# Patient Record
Sex: FEMALE | Race: WHITE | NOT HISPANIC OR LATINO | URBAN - METROPOLITAN AREA
[De-identification: names, ages, dates, MRNs, and addresses within clinical notes are randomized per-mention and may not be internally consistent; named-entity substitution may affect disease eponyms.]

---

## 2020-02-12 ENCOUNTER — OFFICE VISIT (OUTPATIENT)
Dept: FAMILY MEDICINE | Facility: CLINIC | Age: 30
End: 2020-02-12
Payer: COMMERCIAL

## 2020-02-12 VITALS
HEIGHT: 64 IN | DIASTOLIC BLOOD PRESSURE: 75 MMHG | OXYGEN SATURATION: 99 % | TEMPERATURE: 97.7 F | HEART RATE: 75 BPM | SYSTOLIC BLOOD PRESSURE: 117 MMHG | BODY MASS INDEX: 24.01 KG/M2 | WEIGHT: 140.6 LBS

## 2020-02-12 DIAGNOSIS — R53.82 CHRONIC FATIGUE: ICD-10-CM

## 2020-02-12 DIAGNOSIS — E06.3 HASHIMOTO'S DISEASE: Primary | ICD-10-CM

## 2020-02-12 DIAGNOSIS — M53.3 SACRAL PAIN: ICD-10-CM

## 2020-02-12 PROBLEM — D72.9: Status: RESOLVED | Noted: 2020-02-12 | Resolved: 2020-02-12

## 2020-02-12 PROBLEM — D50.9 IRON DEFICIENCY ANEMIA: Status: ACTIVE | Noted: 2020-02-12

## 2020-02-12 PROBLEM — D72.829 ELEVATED WHITE BLOOD CELL COUNT: Status: ACTIVE | Noted: 2020-02-12

## 2020-02-12 PROBLEM — G43.109 MIGRAINE WITH AURA: Status: ACTIVE | Noted: 2020-02-12

## 2020-02-12 PROBLEM — F42.9 OCD (OBSESSIVE COMPULSIVE DISORDER): Status: ACTIVE | Noted: 2020-02-12

## 2020-02-12 PROBLEM — B00.1 HERPES LABIALIS: Status: ACTIVE | Noted: 2020-02-12

## 2020-02-12 PROBLEM — I34.1 MITRAL VALVE PROLAPSE: Status: ACTIVE | Noted: 2020-02-12

## 2020-02-12 PROBLEM — K58.9 IRRITABLE BOWEL SYNDROME: Status: ACTIVE | Noted: 2020-02-12

## 2020-02-12 PROBLEM — D72.9: Status: ACTIVE | Noted: 2020-02-12

## 2020-02-12 PROCEDURE — 99203 OFFICE O/P NEW LOW 30 MIN: CPT | Performed by: FAMILY MEDICINE

## 2020-02-12 RX ORDER — VALACYCLOVIR HYDROCHLORIDE 500 MG/1
TABLET, FILM COATED ORAL
COMMUNITY
Start: 2020-02-06 | End: 2020-02-12 | Stop reason: SDUPTHER

## 2020-02-12 RX ORDER — VALACYCLOVIR HYDROCHLORIDE 500 MG/1
500 TABLET, FILM COATED ORAL DAILY
Qty: 90 TABLET | Refills: 1 | Status: ON HOLD | OUTPATIENT
Start: 2020-02-12 | End: 2021-03-18

## 2020-02-12 RX ORDER — SERTRALINE HYDROCHLORIDE 50 MG/1
TABLET, FILM COATED ORAL
COMMUNITY
Start: 2019-12-11 | End: 2020-02-12 | Stop reason: SDUPTHER

## 2020-02-12 RX ORDER — LEVOTHYROXINE SODIUM 88 UG/1
TABLET ORAL
COMMUNITY
Start: 2019-12-24 | End: 2020-02-12 | Stop reason: SDUPTHER

## 2020-02-12 RX ORDER — LEVOTHYROXINE SODIUM 88 UG/1
88 TABLET ORAL
Qty: 90 TABLET | Refills: 1 | Status: SHIPPED | OUTPATIENT
Start: 2020-02-12 | End: 2020-11-15 | Stop reason: SDUPTHER

## 2020-02-12 RX ORDER — SERTRALINE HYDROCHLORIDE 50 MG/1
50 TABLET, FILM COATED ORAL DAILY
Qty: 90 TABLET | Refills: 0 | Status: ON HOLD | OUTPATIENT
Start: 2020-02-12 | End: 2021-03-18

## 2020-02-12 SDOH — HEALTH STABILITY: MENTAL HEALTH: HOW OFTEN DO YOU HAVE A DRINK CONTAINING ALCOHOL?: NEVER

## 2020-02-12 ASSESSMENT — ENCOUNTER SYMPTOMS
FATIGUE: 1
BACK PAIN: 1

## 2020-02-12 NOTE — PATIENT INSTRUCTIONS
Lets recheck your labs in March.   Try the stretches for at least 4 weeks.   Let me know if they are not improving. You can also get the xray at that time.

## 2020-02-12 NOTE — PROGRESS NOTES
[place] [address]  DAPHNIE Deutsch [facility]ly [facility]  [phone]       Reason for visit:   Chief Complaint   Patient presents with   • thyroid issues      HPI   Sherita Chavez is a 29 y.o. female who presents for follow-up of thyroid.      1. Hypothyroid  Diagnosed at 9yo.   Followed with derick akers.   Takes synthroid 88mcg daily.   Has chronic fatigue.   Last labs in December.     2. Sacral Pain  For ~ 6 weeks.   No known injury.   Bothers her every day.   Sometimes worse with sittin on a certain surface but no particular pattern.   Sometimes worse with bending.   1/10 in intensity.   Hurts to touch.     3. Chronic fatigue-  Present for 10 years.   Started with mono dx in college, feels like it never recovered.   Sleeps 8-10 hours every night.   Started zoloft in 2018 for OCD, no difference in energy.          Past Medical History:   Diagnosis Date   • Hashimoto's disease    • High white blood cell cystine    • IBS (irritable bowel syndrome)    • Migraines    • Mitral valve prolapse    • OCD (obsessive compulsive disorder)      Past Surgical History:   Procedure Laterality Date   • WISDOM TOOTH EXTRACTION       Social History     Socioeconomic History   • Marital status:      Spouse name: Not on file   • Number of children: Not on file   • Years of education: Not on file   • Highest education level: Not on file   Occupational History   • Not on file   Social Needs   • Financial resource strain: Not on file   • Food insecurity:     Worry: Not on file     Inability: Not on file   • Transportation needs:     Medical: Not on file     Non-medical: Not on file   Tobacco Use   • Smoking status: Never Smoker   • Smokeless tobacco: Never Used   Substance and Sexual Activity   • Alcohol use: Never     Frequency: Never   • Drug use: Not on file   • Sexual activity: Not on file   Lifestyle   • Physical activity:     Days per week: Not on file     Minutes per session: Not  "on file   • Stress: Not on file   Relationships   • Social connections:     Talks on phone: Not on file     Gets together: Not on file     Attends Zoroastrian service: Not on file     Active member of club or organization: Not on file     Attends meetings of clubs or organizations: Not on file     Relationship status: Not on file   • Intimate partner violence:     Fear of current or ex partner: Not on file     Emotionally abused: Not on file     Physically abused: Not on file     Forced sexual activity: Not on file   Other Topics Concern   • Not on file   Social History Narrative    Do you wear your seatbelt? Yes    Do you have smoke detector in your home? Yes    Do you have a carbon monoxide detector in your home? 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      Family History   Problem Relation Age of Onset   • Colon cancer Paternal Grandfather    • Breast cancer Neg Hx      Amoxicillin; Avelox [moxifloxacin]; Bactrim [sulfamethoxazole-trimethoprim]; and Ciprofloxacin  Current Outpatient Medications   Medication Sig Dispense Refill   • sertraline (ZOLOFT) 50 mg tablet Take 1 tablet (50 mg total) by mouth daily. 90 tablet 0   • SYNTHROID 88 mcg tablet Take 1 tablet (88 mcg total) by mouth daily. Brand necessary 90 tablet 1   • valACYclovir (VALTREX) 500 mg tablet Take 1 tablet (500 mg total) by mouth daily. 90 tablet 1     No current facility-administered medications for this visit.        Review of Systems   Constitutional: Positive for fatigue.   Musculoskeletal: Positive for back pain.     Objective   Vitals:    02/12/20 1735   BP: 117/75   BP Location: Left upper arm   Patient Position: Sitting   Pulse: 75   Temp: 36.5 °C (97.7 °F)   TempSrc: Oral   SpO2: 99%   Weight: 63.8 kg (140 lb 9.6 oz)   Height: 1.626 m (5' 4\")       Physical Exam   Constitutional: She appears well-developed and well-nourished.   Cardiovascular: Normal rate, regular rhythm, S1 normal and S2 normal.   Pulmonary/Chest: " Effort normal and breath sounds normal. No respiratory distress. She has no wheezes. She has no rhonchi. She has no rales.   Musculoskeletal: She exhibits no edema.   + TTP over sacrum; No parapspinal TTP;  5/5 strength lower extremities.  Normal symmetric patellar reflexes.  No visible scoliosis.  Mild pain with flexion and extension.     Psychiatric: She has a normal mood and affect. Her speech is normal and behavior is normal. Judgment and thought content normal. Cognition and memory are normal.       Procedures    No results found for: WBC, HGB, HCT, PLT, CHOL, TRIG, HDL, LDLDIRECT, ALT, AST, NA, K, CL, CREATININE, BUN, CO2, TSH, PSA, INR, HGBA1C, MICROALBUR      Assessment   Problem List Items Addressed This Visit        Endocrine/Metabolic    Hashimoto's disease - Primary     Refill meds.   Check labs in March.          Relevant Medications    SYNTHROID 88 mcg tablet    Other Relevant Orders    TSH w reflex FT4       Other    Chronic fatigue     Check labs as ordered in March.          Relevant Orders    CBC and Differential    Comprehensive metabolic panel    TSH w reflex FT4    ARIADNA Screen (Automated)      Other Visit Diagnoses     Sacral pain        Trial of stretches.   Xray and f/u in 4 weeks if no improvement.     Relevant Orders    X-RAY SACRUM AND COCCYX              Tanisha Plummer MD  2/12/2020

## 2020-02-26 ENCOUNTER — OFFICE VISIT (OUTPATIENT)
Dept: CARDIOLOGY | Facility: CLINIC | Age: 30
End: 2020-02-26
Payer: COMMERCIAL

## 2020-02-26 ENCOUNTER — TELEPHONE (OUTPATIENT)
Dept: CARDIOLOGY | Facility: CLINIC | Age: 30
End: 2020-02-26

## 2020-02-26 VITALS
BODY MASS INDEX: 23.9 KG/M2 | DIASTOLIC BLOOD PRESSURE: 68 MMHG | HEART RATE: 81 BPM | WEIGHT: 140 LBS | SYSTOLIC BLOOD PRESSURE: 110 MMHG | RESPIRATION RATE: 14 BRPM | HEIGHT: 64 IN

## 2020-02-26 DIAGNOSIS — R00.2 PALPITATIONS: ICD-10-CM

## 2020-02-26 DIAGNOSIS — I49.3 PVC (PREMATURE VENTRICULAR CONTRACTION): ICD-10-CM

## 2020-02-26 DIAGNOSIS — I34.1 MITRAL VALVE PROLAPSE: ICD-10-CM

## 2020-02-26 DIAGNOSIS — I34.1 MVP (MITRAL VALVE PROLAPSE): Primary | ICD-10-CM

## 2020-02-26 PROCEDURE — 99244 OFF/OP CNSLTJ NEW/EST MOD 40: CPT | Performed by: INTERNAL MEDICINE

## 2020-02-26 PROCEDURE — 93000 ELECTROCARDIOGRAM COMPLETE: CPT | Performed by: INTERNAL MEDICINE

## 2020-02-26 RX ORDER — VALACYCLOVIR HYDROCHLORIDE 1 G/1
2 TABLET, FILM COATED ORAL AS NEEDED
Status: ON HOLD | COMMUNITY
End: 2021-03-16 | Stop reason: SDUPTHER

## 2020-02-26 NOTE — LETTER
"2020     Tanisha Plummer MD  [address] [address]    Patient: Sherita Chavez  YOB: 1990  Date of Visit: 2020      Dear Dr. Plummer:    Thank you for referring Sherita Chavez to me for evaluation. Below are my notes for this consultation.    If you have questions, please do not hesitate to call me. I look forward to following your patient along with you.         Sincerely,        Prabhu Cook DO        CC: No Recipients  Prabhu Cook DO  2020  3:34 PM  Sign at close encounter       Prabhu Cook D.O., [facility]    Clinical Cardiology and Heart Failure     [facility]  The Heart [facility] Level  [address] [place] [address]     TEL  [phone]  [facility]       2020    Re:  Sherita Chavez  : 1990    Dear Dr. Tanisha Plummer,    Thank you, for asking me to see Sherita for transfer of her cardiology care as her longtime cardiologist, Dr. Dev Neely, retired.  She was diagnosed with mitral valve prolapse years ago and was told at one point she may have grown out of it but now it is just \"mild\".  He would do echocardiograms on alternating years along with physical exam and EKG.    She is a pleasant 29-year-old female who was diagnosed with Hashimoto's thyroiditis and mitral valve prolapse at age 10.  In  she had the abrupt onset of severe palpitations at night and could really catch her breath on went to the emergency room and had SVT and received adenosine.  She has had no further recurrence of the SVT since then.  She does have chronic PVCs however and feels occasional flutters including a few in the waiting room today.  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  She frequently takes the elevator.  Her apartment is on the fourth floor and by walking up 4 flights of steps might " "be out of breath.  She has felt intermittent swelling of the hands and feet of late.  Her thyroid testing has been normal.  She denies orthopnea or PND.  No chest pain or syncope.    PAST MEDICAL HISTORY:  All aspects of this documentation have been updated and/or entered into our EHR.  [place] tooth extraction, SVT, MVP, OCD, Hashimoto's thyroiditis    MEDICATIONS:   · Sertraline  · Synthroid  · Valacyclovir    ALLERGIES: Amoxicillin, Avelox, Bactrim, Cipro, all caused hives and rash    SOCIAL HISTORY: She is  to her  Dylon.  No children.  She is an RN at [employer] on the [employer] floor.  She has never smoked.    FAMILY HISTORY: Mother is alive with hyperlipidemia.  Dad is alive and has atrial fibrillation.  Paternal grandfather with atrial fibrillation.  She has 2 brothers.  One might of had a heart murmur.    REVIEW OF SYSTEMS: Aside from what is mentioned above, a 14 point review of systems was performed and was negative.    PHYSICAL EXAMINATION:  Visit Vitals  /68   Pulse 81   Resp 14   Ht 1.626 m (5' 4\")   Wt 63.5 kg (140 lb)   BMI 24.03 kg/m²     Body surface area is 1.69 meters squared.  General: Pleasant and in no acute distress.  HEENT: No corneal arcus or xanthelasmas.  Sclerae are anicteric.  Nares patent.  Mucous membranes moist.  Neck: Supple.  JVP is 5 cm/H2O.  Carotids are equal with no audible bruits.  No lymphadenopathy or thyromegaly.  Heart: Regular.  Normal S1 and S2.  No S4. No S3.  I cannot appreciate any murmurs despite handgrip, forced expiration, lateral recumbent, supine etc.  I cannot hear a mitral click.  Chest: Symmetrical.  Lungs: Clear bilaterally without rales, wheezes nor rhonchi.  Abdomen: Soft, nontender.  No masses or bruits.  No organomegaly.  Normal bowel sounds.  Extremities: No cyanosis, clubbing or edema.  Distal pulses are easily palpable.  Skin: Warm and dry and well perfused.  Neurologic: Alert and oriented ×3.  Cranial nerves II through XII are " "intact.  Psychiatric: Normal mood, affect & judgment.    DIAGNOSTIC DATA:    EKG: Normal sinus rhythm, AZ interval is normal, not short as the computer interpreted.  This is a normal tracing.    Labs:       No results found for: NA, K, MG, BUN, CREATININE, EGFR, BNP, WBC, HGB, PLT, ALT, AST, GLUCOSE, HGBA1C, TSH, INR, TROPONINI    Lipid Profile:   No results found for: CHOL  No results found for: TRIG  No results found for: HDL  No results found for: LDLCALC    Echocardiogram 2017:  · Performed at Dr. Dev Neely's office; report unavailable  · Reportedly \"mild MVP\"    Holter monitor 2017:  · Performed at Dr. Dev Neely's office; report unavailable  · Reportedly isolated PVCs    IMPRESSION/RECOMMENDATIONS:  1. History of mitral valve prolapse -Sherita was diagnosed with mitral valve prolapse at age 10.  Her previous cardiologist was very concerned and told her to avoid salt and that she would likely need valve replacement in the future.  She got a second opinion with Dr. Dev Neely and followed with him for many years until his recent USP.  He would do echocardiograms periodically and really downplayed the degree of MVP which I tend to agree with, given her rather benign physical examination.  She was told she had \"floppy\" valve but it really does not sound like the classic Faith's syndrome valve on examination.  Nonetheless I will check a follow-up echocardiogram to assess the degree of structure and function at the age of 29 as her heart has matured.  2. PVCs -she is ultrasensitive to the PVCs and felt some in the waiting room and then some again in the exam here but twelve-lead EKG for the 10-second 20 analyze did not have a single ectopic beat.  PVCs are common and benign especially in the setting of preserved ejection fraction.  MVP patients might be more prone to having PVCs.  3. History of SVT -this occurred in 2007 and was severe palpitations and she required adenosine in the emergency " department.  She has had no recurrence.  4. Hashimoto's thyroiditis with resultant hypothyroidism -she is on Synthroid therapy and thyroid function tests have been followed through your office.  5. Weight gain -she has had 20 pound weight gain in the past 6 months.  I do not detect this to be anything in regard to fluid retention.    Counseling and coordination of care exceeded >50% of this 60-minute patient encounter.    Dr. Plummer, thank you for allowing me to share in the care of your patient.  I asked Sherita to return in 1 year.  If you have any further questions, please do not hesitate to contact me.    Sincerely,    Prabhu Cook D.O., [facility]

## 2020-02-26 NOTE — PATIENT INSTRUCTIONS
1. You carry a history of MVP since age 10  2. Your examination of the heart is rather benign so perhaps very very very very very mild MVP  3. PVCs have been chronic and the palpitations from these should not wear you  4. Please schedule echocardiogram soon  5. We will call you with the results  6. Return in 1 year and then we plan on follow-up thereafter

## 2020-02-26 NOTE — PROGRESS NOTES
"     Prabhu Cook D.O., [facility]    Clinical Cardiology and Heart Failure     [facility]     [facility]  The Heart [address] Level  [address] [place] [address]     TEL  [phone]  [facility]       2020    Re:  Sherita Chavez  : 1990    Dear Dr. Tanisha Plummer,    Thank you, for asking me to see Sherita for transfer of her cardiology care as her longtime cardiologist, Dr. Dev Neely, retired.  She was diagnosed with mitral valve prolapse years ago and was told at one point she may have grown out of it but now it is just \"mild\".  He would do echocardiograms on alternating years along with physical exam and EKG.    She is a pleasant 29-year-old female who was diagnosed with Hashimoto's thyroiditis and mitral valve prolapse at age 10.  In  she had the abrupt onset of severe palpitations at night and could really catch her breath on went to the emergency room and had SVT and received adenosine.  She has had no further recurrence of the SVT since then.  She does have chronic PVCs however and feels occasional flutters including a few in the waiting room today.  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  She frequently takes the elevator.  Her apartment is on the fourth floor and by walking up 4 flights of steps might be out of breath.  She has felt intermittent swelling of the hands and feet of late.  Her thyroid testing has been normal.  She denies orthopnea or PND.  No chest pain or syncope.    PAST MEDICAL HISTORY:  All aspects of this documentation have been updated and/or entered into our EHR.  [place] tooth extraction, SVT, MVP, OCD, Hashimoto's thyroiditis    MEDICATIONS:   · Sertraline  · Synthroid  · Valacyclovir    ALLERGIES: Amoxicillin, Avelox, Bactrim, Cipro, all caused hives and rash    SOCIAL HISTORY: She is  to her  Dylon.  No children.  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  She has never smoked.    FAMILY HISTORY: Mother is alive with hyperlipidemia.  Dad is alive and has atrial fibrillation.  Paternal grandfather with atrial fibrillation.  She has 2 brothers.  One might of had a heart murmur.    REVIEW OF SYSTEMS: Aside from what is mentioned above, a 14 point review of systems was performed and was negative.    PHYSICAL EXAMINATION:  Visit Vitals  /68   Pulse 81   Resp 14   Ht 1.626 m (5' 4\")   Wt 63.5 kg (140 lb)   BMI 24.03 kg/m²     Body surface area is 1.69 meters squared.  General: Pleasant and in no acute distress.  HEENT: No corneal arcus or xanthelasmas.  Sclerae are anicteric.  Nares patent.  Mucous membranes moist.  Neck: Supple.  JVP is 5 cm/H2O.  Carotids are equal with no audible bruits.  No lymphadenopathy or thyromegaly.  Heart: Regular.  Normal S1 and S2.  No S4. No S3.  I cannot appreciate any murmurs despite handgrip, forced expiration, lateral recumbent, supine etc.  I cannot hear a mitral click.  Chest: Symmetrical.  Lungs: Clear bilaterally without rales, wheezes nor rhonchi.  Abdomen: Soft, nontender.  No masses or bruits.  No organomegaly.  Normal bowel sounds.  Extremities: No cyanosis, clubbing or edema.  Distal pulses are easily palpable.  Skin: Warm and dry and well perfused.  Neurologic: Alert and oriented ×3.  Cranial nerves II through XII are intact.  Psychiatric: Normal mood, affect & judgment.    DIAGNOSTIC DATA:    EKG: Normal sinus rhythm, NE interval is normal, not short as the computer interpreted.  This is a normal tracing.    Labs:       No results found for: NA, K, MG, BUN, CREATININE, EGFR, BNP, WBC, HGB, PLT, ALT, AST, GLUCOSE, HGBA1C, TSH, INR, TROPONINI    Lipid Profile:   No results found for: CHOL  No results found for: TRIG  No results found for: HDL  No results found for: LDLCALC    Echocardiogram 2017:  · Performed at Dr. Dev Neely's office; report unavailable  · Reportedly \"mild " "MVP\"    Holter monitor 2017:  · Performed at Dr. Dev Neely's office; report unavailable  · Reportedly isolated PVCs    IMPRESSION/RECOMMENDATIONS:  1. History of mitral valve prolapse -Sherita was diagnosed with mitral valve prolapse at age 10.  Her previous cardiologist was very concerned and told her to avoid salt and that she would likely need valve replacement in the future.  She got a second opinion with Dr. Dev Neely and followed with him for many years until his recent jail.  He would do echocardiograms periodically and really downplayed the degree of MVP which I tend to agree with, given her rather benign physical examination.  She was told she had \"floppy\" valve but it really does not sound like the classic Faith's syndrome valve on examination.  Nonetheless I will check a follow-up echocardiogram to assess the degree of structure and function at the age of 29 as her heart has matured.  2. PVCs -she is ultrasensitive to the PVCs and felt some in the waiting room and then some again in the exam here but twelve-lead EKG for the 10-second 20 analyze did not have a single ectopic beat.  PVCs are common and benign especially in the setting of preserved ejection fraction.  MVP patients might be more prone to having PVCs.  3. History of SVT -this occurred in 2007 and was severe palpitations and she required adenosine in the emergency department.  She has had no recurrence.  4. Hashimoto's thyroiditis with resultant hypothyroidism -she is on Synthroid therapy and thyroid function tests have been followed through your office.  5. Weight gain -she has had 20 pound weight gain in the past 6 months.  I do not detect this to be anything in regard to fluid retention.    Counseling and coordination of care exceeded >50% of this 60-minute patient encounter.    Dr. Plummer, thank you for allowing me to share in the care of your patient.  I asked Sherita to return in 1 year.  If you have any further questions, " please do not hesitate to contact me.    Sincerely,    Prabhu Cook D.O., [facility]

## 2020-06-09 LAB
ALBUMIN SERPL-MCNC: 4.6 G/DL (ref 3.8–4.8)
ALBUMIN/GLOB SERPL: 1.9 {RATIO} (ref 1.2–2.2)
ALP SERPL-CCNC: 65 IU/L (ref 39–117)
ALT SERPL-CCNC: 8 IU/L (ref 0–32)
ANA TITR SER IF: NEGATIVE {TITER}
AST SERPL-CCNC: 12 IU/L (ref 0–40)
BASOPHILS # BLD AUTO: 0 X10E3/UL (ref 0–0.2)
BASOPHILS NFR BLD AUTO: 1 %
BILIRUB SERPL-MCNC: 0.4 MG/DL (ref 0–1.2)
BUN SERPL-MCNC: 6 MG/DL (ref 6–20)
BUN/CREAT SERPL: 8 (ref 9–23)
CALCIUM SERPL-MCNC: 9.4 MG/DL (ref 8.7–10.2)
CHLORIDE SERPL-SCNC: 101 MMOL/L (ref 96–106)
CO2 SERPL-SCNC: 23 MMOL/L (ref 20–29)
CREAT SERPL-MCNC: 0.71 MG/DL (ref 0.57–1)
EOSINOPHIL # BLD AUTO: 0.2 X10E3/UL (ref 0–0.4)
EOSINOPHIL NFR BLD AUTO: 3 %
ERYTHROCYTE [DISTWIDTH] IN BLOOD BY AUTOMATED COUNT: 12.2 % (ref 11.7–15.4)
GLOBULIN SER CALC-MCNC: 2.4 G/DL (ref 1.5–4.5)
GLUCOSE SERPL-MCNC: 75 MG/DL (ref 65–99)
HCT VFR BLD AUTO: 43.5 % (ref 34–46.6)
HGB BLD-MCNC: 14.1 G/DL (ref 11.1–15.9)
IMM GRANULOCYTES # BLD AUTO: 0 X10E3/UL (ref 0–0.1)
IMM GRANULOCYTES NFR BLD AUTO: 0 %
LAB CORP EGFR IF AFRICN AM: 131 ML/MIN/1.73
LAB CORP EGFR IF NONAFRICN AM: 114 ML/MIN/1.73
LAB CORP PLEASE NOTE:: NORMAL
LYMPHOCYTES # BLD AUTO: 1.4 X10E3/UL (ref 0.7–3.1)
LYMPHOCYTES NFR BLD AUTO: 29 %
MCH RBC QN AUTO: 30.9 PG (ref 26.6–33)
MCHC RBC AUTO-ENTMCNC: 32.4 G/DL (ref 31.5–35.7)
MCV RBC AUTO: 95 FL (ref 79–97)
MONOCYTES # BLD AUTO: 0.5 X10E3/UL (ref 0.1–0.9)
MONOCYTES NFR BLD AUTO: 10 %
NEUTROPHILS # BLD AUTO: 2.8 X10E3/UL (ref 1.4–7)
NEUTROPHILS NFR BLD AUTO: 57 %
PLATELET # BLD AUTO: 200 X10E3/UL (ref 150–450)
POTASSIUM SERPL-SCNC: 3.9 MMOL/L (ref 3.5–5.2)
PROT SERPL-MCNC: 7 G/DL (ref 6–8.5)
RBC # BLD AUTO: 4.57 X10E6/UL (ref 3.77–5.28)
SODIUM SERPL-SCNC: 139 MMOL/L (ref 134–144)
T4 FREE SERPL-MCNC: 1.44 NG/DL (ref 0.82–1.77)
TSH SERPL DL<=0.005 MIU/L-ACNC: 1.9 UIU/ML (ref 0.45–4.5)
WBC # BLD AUTO: 4.9 X10E3/UL (ref 3.4–10.8)

## 2020-06-10 NOTE — RESULT ENCOUNTER NOTE
Called patient to discuss results.  All results normal. Left voicemail to notify patient. Call back with any questions.

## 2020-08-17 ENCOUNTER — TELEPHONE (OUTPATIENT)
Dept: CARDIOLOGY | Facility: CLINIC | Age: 30
End: 2020-08-17

## 2020-08-17 DIAGNOSIS — I34.1 MVP (MITRAL VALVE PROLAPSE): Primary | ICD-10-CM

## 2020-08-17 DIAGNOSIS — I49.3 PVC (PREMATURE VENTRICULAR CONTRACTION): ICD-10-CM

## 2020-08-17 NOTE — TELEPHONE ENCOUNTER
Patient is trying to reschedule an Echo that was cancelled 6/2020 but there is no order. Please advise

## 2020-08-17 NOTE — TELEPHONE ENCOUNTER
Precert needed for Echo not yet scheduled @[facility] [phone].     Prior authorization may have .

## 2020-09-15 ENCOUNTER — TRANSCRIBE ORDERS (OUTPATIENT)
Dept: SCHEDULING | Age: 30
End: 2020-09-15

## 2020-09-15 DIAGNOSIS — Z34.01 ENCOUNTER FOR SUPERVISION OF NORMAL FIRST PREGNANCY, FIRST TRIMESTER: ICD-10-CM

## 2020-09-15 DIAGNOSIS — Z34.00 ENCOUNTER FOR SUPERVISION OF NORMAL FIRST PREGNANCY, UNSPECIFIED TRIMESTER: Primary | ICD-10-CM

## 2020-09-20 LAB — HBV SURFACE AG SER QL: NONREACTIVE

## 2020-09-22 LAB
HBV SURFACE AG SER QL: NONREACTIVE
HIV 1+2 AB+HIV1 P24 AG SERPL QL IA: NONREACTIVE
RPR SER QL: NORMAL
RUBELLA IGG SCREEN: NORMAL

## 2020-09-29 ENCOUNTER — HOSPITAL ENCOUNTER (OUTPATIENT)
Dept: RADIOLOGY | Age: 30
Discharge: HOME | End: 2020-09-29
Payer: COMMERCIAL

## 2020-09-29 DIAGNOSIS — Z34.00 ENCOUNTER FOR SUPERVISION OF NORMAL FIRST PREGNANCY, UNSPECIFIED TRIMESTER: ICD-10-CM

## 2020-09-29 DIAGNOSIS — Z34.01 ENCOUNTER FOR SUPERVISION OF NORMAL FIRST PREGNANCY, FIRST TRIMESTER: ICD-10-CM

## 2020-09-29 PROCEDURE — 76801 OB US < 14 WKS SINGLE FETUS: CPT

## 2020-09-30 ENCOUNTER — TELEPHONE (OUTPATIENT)
Dept: SCHEDULING | Facility: CLINIC | Age: 30
End: 2020-09-30

## 2020-09-30 ENCOUNTER — TRANSCRIBE ORDERS (OUTPATIENT)
Dept: SCHEDULING | Age: 30
End: 2020-09-30

## 2020-09-30 DIAGNOSIS — Z36.89 ENCOUNTER FOR OTHER SPECIFIED ANTENATAL SCREENING: Primary | ICD-10-CM

## 2020-09-30 NOTE — TELEPHONE ENCOUNTER
Manny, please have her added to Monday 10/5 schedule and yes I want her to get the echo because I ordered it and I want it and she did not get it done because of COVID but I wanted it, and I ordered it and I needed and it should get scheduled; so if I order something and they do not get it done I always want it to get done, that is why I order it, because I want it

## 2020-09-30 NOTE — TELEPHONE ENCOUNTER
Dr Cook pat has increased palpatation since becoming pregnant. OB suggested she see Dr Cook.  Patient states her echo ordered by Dr Cook in the spring was cx'd due to covid. Next available appnt is the end of October. Patient asking to be seen sooner and she wants to know if Dr Cook wants the echo performed at this time. Please call patient at [phone].

## 2020-10-02 ENCOUNTER — HOSPITAL ENCOUNTER (OUTPATIENT)
Dept: CARDIOLOGY | Facility: HOSPITAL | Age: 30
Discharge: HOME | End: 2020-10-02
Attending: INTERNAL MEDICINE
Payer: COMMERCIAL

## 2020-10-02 VITALS
HEART RATE: 99 BPM | DIASTOLIC BLOOD PRESSURE: 70 MMHG | SYSTOLIC BLOOD PRESSURE: 105 MMHG | HEIGHT: 64 IN | WEIGHT: 140 LBS | BODY MASS INDEX: 23.9 KG/M2

## 2020-10-02 DIAGNOSIS — I34.1 MVP (MITRAL VALVE PROLAPSE): ICD-10-CM

## 2020-10-02 PROCEDURE — 93306 TTE W/DOPPLER COMPLETE: CPT | Mod: 26 | Performed by: INTERNAL MEDICINE

## 2020-10-02 PROCEDURE — 93306 TTE W/DOPPLER COMPLETE: CPT

## 2020-10-04 LAB
AORTIC ROOT ANNULUS - M-MODE: 2.2 CM
BSA FOR ECHO PROCEDURE: 1.69 M2
CUSP SEPARATION: 1.6 CM
DOP CALC LVOT STROKE VOLUME: 77.07 ML
E WAVE DECELERATION TIME: 156 MS
E/A RATIO: 1.3
E/E' RATIO: 7.7
E/LAT E' RATIO: 5.5
EDV (BP): 67 CM3
EF (A4C): 59 %
EF A2C: 68 %
EJECTION FRACTION: 64 %
EST RIGHT VENT SYSTOLIC PRESSURE BY TRICUSPID REGURGITATION JET: 19 MMHG
ESV (BP): 24 CM3
FRACTIONAL SHORTENING: 28.6 %
INTERVENTRICULAR SEPTUM: 0.9 CM
LA ESV (BP): 35 CM3
LA ESV INDEX (A2C): 23.08 CM3/M2
LA ESV INDEX (BP): 20.71 CM3/M2
LA/AORTA RATIO: 1.32
LAAS-AP2: 15 CM2
LAAS-AP4: 13.6 CM2
LAD 2D - M-MODE: 2.9 CM
LALD A4C: 4.49 CM
LALD A4C: 4.59 CM
LAV-S: 39 CM3
LEFT ATRIUM VOLUME INDEX: 18.93 CM3/M2
LEFT ATRIUM VOLUME: 32 CM3
LEFT INTERNAL DIMENSION IN SYSTOLE: 3 CM (ref 2.36–3.57)
LEFT VENTRICLE DIASTOLIC VOLUME INDEX: 36.69 CM3/M2
LEFT VENTRICLE DIASTOLIC VOLUME: 62 CM3
LEFT VENTRICLE SYSTOLIC VOLUME INDEX: 14.79 CM3/M2
LEFT VENTRICLE SYSTOLIC VOLUME: 25 CM3
LEFT VENTRICULAR INTERNAL DIMENSION IN DIASTOLE: 4.2 CM (ref 3.98–5.53)
LEFT VENTRICULAR POSTERIOR WALL IN END DIASTOLE: 0.8 CM (ref 0.52–0.96)
LV DIASTOLIC VOLUME: 72 CM3
LV ESV (APICAL 2 CHAMBER): 23 CM3
LVAD-AP2: 25.4 CM2
LVAD-AP4: 24.2 CM2
LVAS-AP2: 12.7 CM2
LVAS-AP4: 13 CM2
LVEDVI(A2C): 42.6 CM3/M2
LVEDVI(BP): 39.64 CM3/M2
LVESVI(A2C): 13.61 CM3/M2
LVESVI(BP): 14.2 CM3/M2
LVLD-AP2: 7.5 CM
LVLD-AP4: 7.66 CM
LVLS-AP2: 6.17 CM
LVLS-AP4: 5.96 CM
LVOT 2D: 1.8 CM
LVOT A: 2.54 CM2
LVOT MG: 5 MMHG
LVOT MG: 5 MMHG
LVOT MV: 1.07 M/S
LVOT PEAK VELOCITY: 1.49 M/S
LVOT VTI: 30.3 CM
MV E'TISSUE VEL-LAT: 0.17 M/S
MV E'TISSUE VEL-MED: 0.12 M/S
MV PEAK A VEL: 0.71 M/S
MV PEAK E VEL: 0.95 M/S
POSTERIOR WALL: 0.8 CM
PULM VEIN S/D RATIO: 1.3
PV PEAK D VEL: 0.47 M/S
PV PEAK S VEL: 0.62 M/S
RVOT VMAX: 1 M/S
RVOT VTI: 20.1 CM
TR MAX PG: 12 MMHG
TRICUSPID VALVE PEAK REGURGITATION VELOCITY: 1.7 M/S
Z-SCORE OF LEFT VENTRICULAR DIMENSION IN END DIASTOLE: -1.1
Z-SCORE OF LEFT VENTRICULAR DIMENSION IN END SYSTOLE: 0.25
Z-SCORE OF LEFT VENTRICULAR POSTERIOR WALL IN END DIASTOLE: 0.65

## 2020-10-05 ENCOUNTER — TELEPHONE (OUTPATIENT)
Dept: CARDIOLOGY | Facility: CLINIC | Age: 30
End: 2020-10-05

## 2020-10-05 ENCOUNTER — OFFICE VISIT (OUTPATIENT)
Dept: CARDIOLOGY | Facility: CLINIC | Age: 30
End: 2020-10-05
Payer: COMMERCIAL

## 2020-10-05 VITALS
WEIGHT: 150 LBS | HEART RATE: 93 BPM | HEIGHT: 64 IN | RESPIRATION RATE: 14 BRPM | BODY MASS INDEX: 25.61 KG/M2 | DIASTOLIC BLOOD PRESSURE: 56 MMHG | SYSTOLIC BLOOD PRESSURE: 120 MMHG

## 2020-10-05 DIAGNOSIS — I47.10 PSVT (PAROXYSMAL SUPRAVENTRICULAR TACHYCARDIA) (CMS/HCC): Primary | ICD-10-CM

## 2020-10-05 DIAGNOSIS — I34.1 MVP (MITRAL VALVE PROLAPSE): ICD-10-CM

## 2020-10-05 DIAGNOSIS — I49.3 PVC (PREMATURE VENTRICULAR CONTRACTION): ICD-10-CM

## 2020-10-05 DIAGNOSIS — R00.2 PALPITATIONS: ICD-10-CM

## 2020-10-05 PROCEDURE — 93000 ELECTROCARDIOGRAM COMPLETE: CPT | Performed by: INTERNAL MEDICINE

## 2020-10-05 PROCEDURE — 99215 OFFICE O/P EST HI 40 MIN: CPT | Performed by: INTERNAL MEDICINE

## 2020-10-05 NOTE — PROGRESS NOTES
"     Prabhu Cook D.O., [facility]    Clinical Cardiology and Heart Failure     [facility]     [facility]  The Heart [address]  [address] [place] [address]     TEL  [phone]  [facility]       10/5/2020    Re:  Sherita Chavez  : 1990    Dear Dr. Tanisha Plummer and SUNITHA Cruz,    Sherita returned to the office for cardiovascular follow-up given her history of palpitations and remote MVP along with adenosine-sensitive SVT and PVCs.    Sherita is currently 14 weeks pregnant.  As soon as she found out she was pregnant her palpitations increased in frequency, intensity and duration.  She has 3 separate types of palpitations.  The first is the \"butterfly\" sensation which she has on a near daily basis.  A second type of palpitation might happen for just a few beats where she will feel a \"skip and then a pound\" such that she can almost see the blood rushing into her eyes.  This may also cause a feeling of dizziness very transiently.  The third type of palpitation is which she described as \"fibrillation\" or like a \"bowl of Jell-O\".  This happens every few days perhaps 2 or 3 times per week but on no occasion has she been presyncopal or had full syncope.  She has had no symptoms while driving.  She feels some mild fluid retention in her fingers and feet but nothing too noticeable.  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    She would like to deliver at the [facility] and [place] with a midwife and request that a clearance letter go to them.    She admits he may not be perfectly hydrated.  She has had some episodes of morning sickness and vomiting.  She tried to take a walk up and down the driveway which would be a typical activity of daily living, but her heart rate up to 140 bpm while her 's was about 110 bpm.    PAST MEDICAL HISTORY:  All aspects of this " "documentation have been updated and/or entered into our EHR.  [place] tooth extraction, SVT, MVP, OCD, Hashimoto's thyroiditis    MEDICATIONS:  Patient's Medications   New Prescriptions    No medications on file   Previous Medications    SERTRALINE (ZOLOFT) 50 MG TABLET    Take 1 tablet (50 mg total) by mouth daily.    SYNTHROID 88 MCG TABLET    Take 1 tablet (88 mcg total) by mouth daily. Brand necessary    VALACYCLOVIR (VALTREX) 1 GRAM TABLET    Take 2 g by mouth as needed.    VALACYCLOVIR (VALTREX) 500 MG TABLET    Take 1 tablet (500 mg total) by mouth daily.   Modified Medications    No medications on file   Discontinued Medications    No medications on file     ALLERGIES: Amoxicillin, Avelox, Bactrim, Cipro, all caused hives and rash    SOCIAL HISTORY: She is  to her  Dylon.  No children.  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  She has never smoked.    FAMILY HISTORY: Mother is alive with hyperlipidemia.  Dad is alive and has atrial fibrillation.  Paternal grandfather with atrial fibrillation.  She has 2 brothers.  One might of had a heart murmur.    REVIEW OF SYSTEMS: Aside from what is mentioned above, a 14 point review of systems was performed and was negative.    PHYSICAL EXAMINATION:  Visit Vitals  BP (!) 120/56   Pulse 93   Resp 14   Ht 1.626 m (5' 4\")   Wt 68 kg (150 lb)   BMI 25.75 kg/m²     Body surface area is 1.75 meters squared.  General: Pleasant and in no acute distress.  HEENT: No corneal arcus or xanthelasmas.  Sclerae are anicteric.  Nares patent.  Mucous membranes moist.  Neck: Supple.  JVP is 5 cm/H2O.  Carotids are equal with no audible bruits.  No lymphadenopathy or thyromegaly.  Heart: Regular.  Normal S1 and S2.  No S4. No S3.  I cannot appreciate any murmurs despite handgrip, forced expiration, lateral recumbent, supine etc.  I cannot hear a mitral click.  Chest: Symmetrical.  Lungs: Clear bilaterally without rales, wheezes nor rhonchi.  Abdomen: Soft, " nontender.  No masses or bruits.  No organomegaly.  Normal bowel sounds.  Extremities: No cyanosis, clubbing or edema.  Distal pulses are easily palpable.  Skin: Warm and dry and well perfused.  Neurologic: Alert and oriented ×3.  Cranial nerves II through XII are intact.  Psychiatric: Normal mood, affect & judgment.    DIAGNOSTIC DATA:    EKG: Normal sinus rhythm, borderline short [place] interval.  The tracing is unchanged.    Labs:  Lab Results   Component Value Date     06/08/2020    K 3.9 06/08/2020    BUN 6 06/08/2020    CREATININE 0.71 06/08/2020    EGFR 114 06/08/2020    EGFR 131 06/08/2020    WBC 4.9 06/08/2020    HGB 14.1 06/08/2020     06/08/2020    ALT 8 06/08/2020    AST 12 06/08/2020    GLUCOSE 75 06/08/2020    TSH 1.900 06/08/2020     Lipid Profile:   No results found for: CHOL  No results found for: TRIG  No results found for: HDL  No results found for: LDLCALC    Echocardiogram 10/2/2020:  · This is a normal study. The patient had a high heart rate for 1 to 2 minutes and the tracing showed sinus rhythm.  · Normal-sized LV. Normal LV wall thickness.  · Preserved LV systolic function. Estimated EF 60- 65%. Normal diastolic filling pattern for age of the LV.  · Normal-sized RV. Normal RV systolic function.  · Normal-sized LA. Normal doppler flow of pulmonary veins.  · Normal-sized RA.  · Aortic root normal.  · Aortic valve structure is normal. Tricuspid aortic valve.  · Normal leaflet structure of the mitral valve. There is no evidence of MVP.  · Trace mitral valve regurgitation.  · Trace tricuspid valve regurgitation.  · Estimated RVSP = 19 mmHg.  · Normal-sized IVC. IVC demonstrates normal respiratory collapse. Normal hepatic vein flow.  · No evidence of pericardial effusion.    Holter monitor 2017:  · Performed at Dr. Dev Neely's office; report unavailable  · Reportedly isolated PVCs    IMPRESSION/RECOMMENDATIONS:  1. Palpitations with a history of adenosine-sensitive PSVT and PVCs  "-Sherita has had an increase in the frequency and severity and intensity of her symptoms since her pregnancy began 14 weeks ago.  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  In addition she admits that she may be on the dehydrated state with some morning sickness and vomiting.  I did tell her that she does have a documented history of some arrhythmias so will be important to document whether these are recurring.  Her echocardiogram from the other day shows a structurally normal heart.  Her examination is also unremarkable.  This carries an excellent prognosis.  I will have her undergo 1 week of heart monitoring with MCOT.  I chose to not begin any pharmacologic therapy at this juncture without understanding whether she is having true arrhythmias so as to avoid potential pharmacologic exposure to her fetus.  2. No clear-cut evidence of mitral valve prolapse -Sherita was diagnosed with mitral valve prolapse at age 10.  Her previous cardiologist was very concerned and told her to avoid salt and that she would likely need valve replacement in the future.  She got a second opinion with Dr. Dev eNely and followed with him for many years until his long term in 2019.  He would do echocardiograms periodically and really downplayed the degree of MVP and he allegedly told her that she had \"floppy\" valve but her echocardiogram shows absolutely no signs of MVP and I told her to remove this from her current medical history.  3. PVCs -she is ultrasensitive to the PVCs and this probably is 1 of the causes of some of her palpitations with the \"skip followed by the pound\".  4. History of adenosine-sensitive PSVT -this occurred in 2007 and was severe palpitations and she required adenosine in the emergency department.   5. Hashimoto's thyroiditis with resultant hypothyroidism -she is on Synthroid therapy and thyroid function tests have been followed through your office.    Counseling and " coordination of care exceeded >50% of this 40-minute patient encounter.    Dr. Plummer and Ms. Bello, thank you for allowing me to share in the care of your patient.  I asked Sherita to return in 4 weeks to see my partner Dr. Aleshia Lyons who carries a special interest in talent and cardiac-maternal medicine.  If you have any further questions, please do not hesitate to contact me.    Sincerely,    Prabhu Cook D.O., [facility]

## 2020-10-05 NOTE — LETTER
"2020     Tanisha Plummer MD  [address] [place] [id number]    Patient: Sherita Chavez  YOB: 1990  Date of Visit: 10/5/2020      Dear Dr. Plummer:    Thank you for referring Sherita Chavez to me for evaluation. Below are my notes for this consultation.    If you have questions, please do not hesitate to call me. I look forward to following your patient along with you.         Sincerely,        Prabhu Cook,         CC: SUNITHA Santacruz MD Droogan, Christopher J, DO  10/5/2020  3:01 PM  Sign when Signing Visit       Prabhu Cook D.O., [facility]    Clinical Cardiology and Heart Failure     [facility]  The Heart [facility] Level  [address] [place] [address]     TEL  [phone]  [facility]       10/5/2020    Re:  Sherita Chavez  : 1990    Dear Dr. Tanisha Plummer and SUNITHA Cruz,    Sherita returned to the office for cardiovascular follow-up given her history of palpitations and remote MVP along with adenosine-sensitive SVT and PVCs.    Sherita is currently 14 weeks pregnant.  As soon as she found out she was pregnant her palpitations increased in frequency, intensity and duration.  She has 3 separate types of palpitations.  The first is the \"butterfly\" sensation which she has on a near daily basis.  A second type of palpitation might happen for just a few beats where she will feel a \"skip and then a pound\" such that she can almost see the blood rushing into her eyes.  This may also cause a feeling of dizziness very transiently.  The third type of palpitation is which she described as \"fibrillation\" or like a \"bowl of Jell-O\".  This happens every few days perhaps 2 or 3 times per week but on no occasion has she been presyncopal or had full syncope.  She has had no symptoms while driving.  She feels some mild fluid retention in " "her fingers and feet but nothing too noticeable.  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    She would like to deliver at the [facility] and [place] with a midwife and request that a clearance letter go to them.    She admits he may not be perfectly hydrated.  She has had some episodes of morning sickness and vomiting.  She tried to take a walk up and down the driveway which would be a typical activity of daily living, but her heart rate up to 140 bpm while her 's was about 110 bpm.    PAST MEDICAL HISTORY:  All aspects of this documentation have been updated and/or entered into our EHR.  [place] tooth extraction, SVT, MVP, OCD, Hashimoto's thyroiditis    MEDICATIONS:  Patient's Medications   New Prescriptions    No medications on file   Previous Medications    SERTRALINE (ZOLOFT) 50 MG TABLET    Take 1 tablet (50 mg total) by mouth daily.    SYNTHROID 88 MCG TABLET    Take 1 tablet (88 mcg total) by mouth daily. Brand necessary    VALACYCLOVIR (VALTREX) 1 GRAM TABLET    Take 2 g by mouth as needed.    VALACYCLOVIR (VALTREX) 500 MG TABLET    Take 1 tablet (500 mg total) by mouth daily.   Modified Medications    No medications on file   Discontinued Medications    No medications on file     ALLERGIES: Amoxicillin, Avelox, Bactrim, Cipro, all caused hives and rash    SOCIAL HISTORY: She is  to her  Dylon.  No children.  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    FAMILY HISTORY: Mother is alive with hyperlipidemia.  Dad is alive and has atrial fibrillation.  Paternal grandfather with atrial fibrillation.  She has 2 brothers.  One might of had a heart murmur.    REVIEW OF SYSTEMS: Aside from what is mentioned above, a 14 point review of systems was performed and was negative.    PHYSICAL EXAMINATION:  Visit Vitals  BP (!) 120/56   Pulse 93   Resp 14   Ht 1.626 m (5' 4\")   Wt 68 kg (150 lb)   BMI 25.75 kg/m² "     Body surface area is 1.75 meters squared.  General: Pleasant and in no acute distress.  HEENT: No corneal arcus or xanthelasmas.  Sclerae are anicteric.  Nares patent.  Mucous membranes moist.  Neck: Supple.  JVP is 5 cm/H2O.  Carotids are equal with no audible bruits.  No lymphadenopathy or thyromegaly.  Heart: Regular.  Normal S1 and S2.  No S4. No S3.  I cannot appreciate any murmurs despite handgrip, forced expiration, lateral recumbent, supine etc.  I cannot hear a mitral click.  Chest: Symmetrical.  Lungs: Clear bilaterally without rales, wheezes nor rhonchi.  Abdomen: Soft, nontender.  No masses or bruits.  No organomegaly.  Normal bowel sounds.  Extremities: No cyanosis, clubbing or edema.  Distal pulses are easily palpable.  Skin: Warm and dry and well perfused.  Neurologic: Alert and oriented ×3.  Cranial nerves II through XII are intact.  Psychiatric: Normal mood, affect & judgment.    DIAGNOSTIC DATA:    EKG: Normal sinus rhythm, borderline short [place] interval.  The tracing is unchanged.    Labs:  Lab Results   Component Value Date     06/08/2020    K 3.9 06/08/2020    BUN 6 06/08/2020    CREATININE 0.71 06/08/2020    EGFR 114 06/08/2020    EGFR 131 06/08/2020    WBC 4.9 06/08/2020    HGB 14.1 06/08/2020     06/08/2020    ALT 8 06/08/2020    AST 12 06/08/2020    GLUCOSE 75 06/08/2020    TSH 1.900 06/08/2020     Lipid Profile:   No results found for: CHOL  No results found for: TRIG  No results found for: HDL  No results found for: LDLCALC    Echocardiogram 10/2/2020:  · This is a normal study. The patient had a high heart rate for 1 to 2 minutes and the tracing showed sinus rhythm.  · Normal-sized LV. Normal LV wall thickness.  · Preserved LV systolic function. Estimated EF 60- 65%. Normal diastolic filling pattern for age of the LV.  · Normal-sized RV. Normal RV systolic function.  · Normal-sized LA. Normal doppler flow of pulmonary veins.  · Normal-sized RA.  · Aortic root  normal.  · Aortic valve structure is normal. Tricuspid aortic valve.  · Normal leaflet structure of the mitral valve. There is no evidence of MVP.  · Trace mitral valve regurgitation.  · Trace tricuspid valve regurgitation.  · Estimated RVSP = 19 mmHg.  · Normal-sized IVC. IVC demonstrates normal respiratory collapse. Normal hepatic vein flow.  · No evidence of pericardial effusion.    Holter monitor 2017:  · Performed at Dr. Dev Neely's office; report unavailable  · Reportedly isolated PVCs    IMPRESSION/RECOMMENDATIONS:  1. Palpitations with a history of adenosine-sensitive PSVT and PVCs -Sherita has had an increase in the frequency and severity and intensity of her symptoms since her pregnancy began 14 weeks ago.  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  In addition she admits that she may be on the dehydrated state with some morning sickness and vomiting.  I did tell her that she does have a documented history of some arrhythmias so will be important to document whether these are recurring.  Her echocardiogram from the other day shows a structurally normal heart.  Her examination is also unremarkable.  This carries an excellent prognosis.  I will have her undergo 1 week of heart monitoring with [facility]OT.  I chose to not begin any pharmacologic therapy at this juncture without understanding whether she is having true arrhythmias so as to avoid potential pharmacologic exposure to her fetus.  2. No clear-cut evidence of mitral valve prolapse -Sherita was diagnosed with mitral valve prolapse at age 10.  Her previous cardiologist was very concerned and told her to avoid salt and that she would likely need valve replacement in the future.  She got a second opinion with Dr. Dev Neely and followed with him for many years until his assisted in 2019.  He would do echocardiograms periodically and really downplayed the degree of MVP and he allegedly told her that she had  "\"floppy\" valve but her echocardiogram shows absolutely no signs of MVP and I told her to remove this from her current medical history.  3. PVCs -she is ultrasensitive to the PVCs and this probably is 1 of the causes of some of her palpitations with the \"skip followed by the pound\".  4. History of adenosine-sensitive PSVT -this occurred in 2007 and was severe palpitations and she required adenosine in the emergency department.   5. Hashimoto's thyroiditis with resultant hypothyroidism -she is on Synthroid therapy and thyroid function tests have been followed through your office.    Counseling and coordination of care exceeded >50% of this 40-minute patient encounter.    Dr. Plummer and Ms. Bello, thank you for allowing me to share in the care of your patient.  I asked Sherita to return in 4 weeks to see my partner Dr. Aleshia Lyons who carries a special interest in talent and cardiac-maternal medicine.  If you have any further questions, please do not hesitate to contact me.    Sincerely,    Prabhu Cook D.O., FAC                               "

## 2020-10-05 NOTE — TELEPHONE ENCOUNTER
Per AVS pt has been scheduled a new patient appt with Dr. Lyons 11/20.    Pt is asking if this is OK because it is 2 weeks from suggested date. Please advise

## 2020-10-05 NOTE — PATIENT INSTRUCTIONS
"1. Let us try to sort out the 3 types of palpitations and arrhythmia feelings you have between the \"butterfly, pound, bowl of Jell-O\"  2. Your echocardiogram from the other day looks perfect.  You do not have MVP.  I would delete that from your medical history  3. [facility]  4. We will call you with the results  5. Please see Dr. Aleshia Lyons in Zone A in 4 weeks and follow with her during your pregnancy.  I have spoken to her about this.  "

## 2020-10-19 ENCOUNTER — TELEPHONE (OUTPATIENT)
Dept: FAMILY MEDICINE | Facility: CLINIC | Age: 30
End: 2020-10-19

## 2020-10-20 NOTE — TELEPHONE ENCOUNTER
All done referral and script faxed to [phone]  ( [facility] [id number]) Dr Ramirez office. Pt notified through portal message

## 2020-10-21 ENCOUNTER — TELEPHONE (OUTPATIENT)
Dept: SCHEDULING | Facility: CLINIC | Age: 30
End: 2020-10-21

## 2020-10-21 NOTE — TELEPHONE ENCOUNTER
Pt states she is unsure if heart monitor that she has been wearing for the past 7 days has picked up any information from her heart, and requesting call back to discuss    [phone]

## 2020-10-22 NOTE — TELEPHONE ENCOUNTER
Morelia,     7 days of MCOT is enough. Please let her know she should send back. Please process for CD so he can review and we will call her with results.     yessy GUSTAFSON

## 2020-11-03 ENCOUNTER — CLINICAL SUPPORT (OUTPATIENT)
Dept: GENETICS | Age: 30
End: 2020-11-03
Attending: ADVANCED PRACTICE MIDWIFE
Payer: COMMERCIAL

## 2020-11-03 DIAGNOSIS — Z14.8 GENETIC CARRIER OF OTHER DISEASE: ICD-10-CM

## 2020-11-03 DIAGNOSIS — Z31.5 ENCOUNTER FOR PROCREATIVE GENETIC COUNSELING: Primary | ICD-10-CM

## 2020-11-03 PROCEDURE — 96040 HC GENETICS COUNSELING SESSIONS: CPT | Performed by: GENETIC COUNSELOR, MS

## 2020-11-03 NOTE — PROGRESS NOTES
Patient Name: Sherita Chavez   : 1990  Date of Service: 2020    Indication for Appointment:  Sherita Chavez presented for genetic counseling at [facility] due to genetic carrier status of Spinal Muscular Atrophy (SMA). Sherita Chavez was referred by MARIXA Barajas CPM and presented to the session alone.    Patient/Pregnancy History:  Sherita Chavez is a 30 year old  female of Karena, English, and Eastern  descent. The following information was obtained regarding this case:   OB Genetic Screening    Genetic Screening/Teratology Counseling- Includes patient, baby's father, or anyone in either family with:  Patient's age 35 years or older as of estimated date of delivery: No   Thalassemia (Italian, Greek, Mediterranean, or  background): MCV less than 80: No   Neural tube defect (Meningomyelocele, Spina bifida, or Anencephaly): No   Congenital heart defect: No   Down syndrome: No   Cl-Sachs (Ashkenazi Episcopal, Cajun, Yoruba Boyd): No   Canavan disease (Ashkenazi Episcopal): No   Familial dysautonomia (Ashkenazi Episcopal): No   Sickle cell disease or trait (): No   Hemophilia or other blood disorders: No   Muscular dystrophy: No    Cystic fibrosis: No   Torrington's chorea: No   Mental retardation/autism: No   Other inherited genetic or chromosomal disorder: No   Maternal metabolic disorder (eg. Type 1 diabetes, PKU): No   Patient or baby's father had child with birth defects not listed above: No   Recurrent pregnancy loss, or a stillbirth: No   Medications (including supplements, vitamins, herbs, or OTC drugs)/illicit/recreational drugs/alcohol since last menstrual period: No           Maternal Age Based Aneuploidy Risk based on being 30 years of age at Estimated Date of Delivery: 2021          1 in 741 chance for a live birth with Down syndrome        1 in 385 chance for a live birth with any maternal age related chromosomal abnormality.      To  date, Sherita Chavez has undergone the following screening:    First Trimester Ultrasound (09/15/2020)   Gestational Age: 13w1   CRL: 68mm   NT: not recorded   ARIA: 04/05/2021   Additional Findings: None noted    Non Invasive Carrier Screen    Patient reports NIPT was negative. Report not available at time of appointment.     Genetic Carrier Screen    Lab: [facility]    Test: Cystic Fibrosis Mutation 97    Specimen ID: [id number]    Result: Negative     Residual Risk: 1 in 343     Genetic Carrier Screen    Lab: [facility]    Test: SMN1 Copy Number Analysis    Specimen ID: [id number]    Result: POSITIVE Carrier- 1 copy of SMN1 detected        Of note, Sherita Chavez's partner had carrier screening for Spinal Muscular Atrophy (SMA) and was identified to have 3 copies of SMN1 ([facility] specimen #[id number]). Therefore, the likelihood of the pregnancy being affected with SMA is greatly reduced. Sherita Chavez presents today to review the genetics of SMA, and discuss diagnostic testing.    Family History  A detailed three-generation family history was obtained, including patient and partner ethnicity.  Significant findings include:  - Sherita Chavez's brother is reportedly diagnosed with a heart murmur and her father has reportedly been diagnosed with atrial fibrillation. See plan for recommendations.  - Sherita Chavez's maternal uncle has a stillbirth due to an unknown cause.   - Sherita Chavez's maternal aunt had breast cancer at age 43. See plan for recommendations.  - Sherita Chavez's partner's sister has seizures resulting from a brain mass that was removed in childhood. She has seizures to this present day, has intellectual disability and cannot live independently.  - Sherita Chavez's partner's father reportedly has atrial fibrillation and congestive heart failure. See plan for recommendations.        Genetic Education/Risk  Assessment/Counseling/Genetic Test Options:  Information was provided about risks during pregnancy based on the information available.  It was reviewed that in every pregnancy there is a 3-5% chance to have a baby with a congenital birth defect.  The basis of genetics, natural history, risks and inheritance patterns were presented as relevant to this case.  Related psychosocial aspects were discussed.    It was discussed that every pregnancy, regardless of maternal age, has a 1.6% risk for a copy number variant (CNV) of probable clinical significance (Steven, 2014).  CNVs are structural variants in the genome (deletions and/or duplications of specific DNA regions) that can be inherited or occur by chance (de sesar). Given this risk, diagnostic testing can be considered for every pregnancy, but is optional.    Spinal Muscular Atrophy:  Spinal Muscular Atrophy (SMA) is an autosomal recessive condition and is the most common fatal disorder in infancy. Werdnig-Parks disease is the most frequent subtype of SMA (Type 1). Individuals with Type 1 are not able to sit up themselves, have low muscle tone and experience difficulty with sucking, swallowing and respiratory difficulties. Some newborns will require intubation secondary to respiratory failure at birth. Infants with SMA may show hypotonia, minimal spontaneous movements and have no suck, gag, or grasp reflexes. Without treatment, the average lifespan for an individual with Type 1 is two-years old.    Ninety-Five percent of individuals with SMA have 0 copies of the SMN1 gene. Five percent of affected individuals have one SMN1 gene which has a point mutation. Rarely, an affected individual may have two SMN1 copies, each with a point mutation.    There is no cure for SMA but effective treatment options exist. Spinraza is the first and currently only FDA approved treatment for SMA. Spinraza is a gene therapy which targets the SMN2 gene, causing it to make more complete  "protein. The goal of Spinraza is to correct splicing errors. Spinraza is administered through intrathecal injections; 4 loading doses are given in the first two-months of treatment followed by maintenance doses every four months. Treatment with Spinraza should begin as soon as diagnosis is made and is lifelong. Additional treatment options for SMA are being researched and parents may elect to enroll their child in clinical trials.    Carriers of SMA have one SMN1 copy and typically are asymptomatic. Silent carriers are individuals with two SMN1 gene mutations in \"cis\" (both SMN1 copies are on one chromosome, leaving the other homologous chromosome without a copy).  Silent carriers are not identified on all carrier screening platforms. Cis configuration occurs more frequently in the presence of single nucleotide polymorphisms (SNPs - the most common type of genetic variation among people with each SNP representing a difference in a single DNA building block, called a nucleotide). Ashkenazi Church and  populations are more likely to have these SNPs and therefore be silent carriers. However, SNPs may occur in other ethnicities. The risk of being a silent carrier of SMA after two SMN1 genes are identified in the presence of a SNP is 1 in 29 for Caucasians.    Carriers of Spinal Muscular Atrophy are asymptomatic. When both parents are carriers of SMA, there is a 1 in 4 (25%) chance to have an affected offspring. A negative carrier screen greatly reduces the chance of being a carrier but does not eliminate the chance.    The risk for Sherita Chavez and her partner to have a pregnancy affected by Spinal Muscular Atrophy is:    1 in 22,400.  This is based on:    1 / 2 chance Sherita Chavez would not pass down a copy of the SMN1 gene   1 / 5,600 chance that Sherita Chavez's partner is still a carrier after a negative screen based on  ethnicity   1 / 2 chance Sherita Chavez's partner would not pass " down a copy of the SMN1 gene if he is a carrier for SMA    Amniocentesis:  An amniocentesis is an invasive, diagnostic procedure used to collect amniotic fluid, which contains fetal cells. Nationally, there is around a 1 in 400 risk for miscarriage due to an amniocentesis. Through this procedure, the fetal cells can be tested for genetic abnormalities including but not limited to aneuploidy, structural chromosomal rearrangements, copy number variants, and single gene disorders. The amniotic fluid can also be tested to determine if an open neural tube defect may be present.    Patient Decisions Summary:  Based upon information discussed today, Sherita Chavez is not pursuing any further testing at this time.    Plan:  Sherita Chavez was confirmed to have understood the aforementioned information and was assisted with decision making as needed.  Informational and supportive resources were provided.  Consent was obtained to share chart note(s) with physicians.  Sheriat Chavez will be contacted via telephone when genetic test results are available.  Sherita Chavez should contact the program with person/family history updates and/or to inquire about new information specific to this case.  The information provided reflects current practice guidelines and may change with new medical discoveries/technology/updated personal or family history information.    Based on the reported history of significant cancer in the family, consideration of cancer genetics evaluation is suggested. More information is available at www.[employer] or by calling [employer]025.GENE (0522).    Based on the reported history of significant cardiovascular conditions in the family, consideration of cardiovascular genetics evaluation is suggested. More information is available at [email or web address].me or by calling 985.307.GENE (4495).    A total of 60 minutes was spent providing genetic counseling to Sherita Chavez

## 2020-11-03 NOTE — LETTER
11/03/20    Yasmin Colvin,   [address] [place] [address]      Dear Dr. Colvin,    Thank you for referring your patient, Sherita Chavez, to receive care through my office. I have enclosed a summary of the care provided to Sherita on 11/03/20.    Please contact me with any questions you may have regarding the visit.    Sincerely,             Joaquin Dick, [facility]    [address] [place] [address]    CC: No Recipients

## 2020-11-03 NOTE — LETTER
11/03/20    Tanisha Plummer MD  [address] [address]      Dear Dr. Plummer,    I am writing to confirm that your patient, Sherita Chavez, received care through my office on 11/03/20. I have enclosed a summary of the care provided to Sherita for your reference.    Please contact me with any questions you may have regarding the visit.    Sincerely,           JEROME Danielson      CC: No Recipients

## 2020-11-03 NOTE — LETTER
20    Sherita Chavez  [address] [place] [address]    Dear Ms. Chavez,    Thank you for participating in the [facility] Genetics and Risk Assessment Program.  It was a pleasure working with you. Attached is documentation from our discussion(s). It will also be sent to any physicians you indicated.      You may also choose to share this documentation with your family members; your relatives are recommended to review this information along with their risks and genetic testing options (if applicable) with their own healthcare providers to derive a risk-appropriate, individualized plan    If you have any questions, concerns, or updates to your personal/family history, please contact the Genetics and Risk Assessment Program at [phone] to further review your case.    Please see below for your encounter report.    Sincerely,         Joaquin Dick DARRELL      Encounter Note    Patient Name: Sherita Chavez   : 1990  Date of Service: 2020    Indication for Appointment:  Sherita Chavez presented for genetic counseling at [facility] due to genetic carrier status of Spinal Muscular Atrophy (SMA). Sherita Chavez was referred by Yasmin Colvin CPM, MARIXA and presented to the session alone.    Patient/Pregnancy History:  Sherita Chavez is a 30 year old  female of Karena, English, and Eastern  descent. The following information was obtained regarding this case:   OB Genetic Screening    Genetic Screening/Teratology Counseling- Includes patient, baby's father, or anyone in either family with:  Patient's age 35 years or older as of estimated date of delivery: No   Thalassemia (Italian, Greek, Mediterranean, or  background): MCV less than 80: No   Neural tube defect (Meningomyelocele, Spina bifida, or Anencephaly): No   Congenital heart defect: No   Down syndrome: No   Cl-Sachs (Ashkenazi Denominational, Cajun, South African Cymro): No   Canavan disease (Ashkenazi  Spiritism): No   Familial dysautonomia (Ashkenazi Spiritism): No   Sickle cell disease or trait (): No   Hemophilia or other blood disorders: No   Muscular dystrophy: No    Cystic fibrosis: No   Wolfforth's chorea: No   Mental retardation/autism: No   Other inherited genetic or chromosomal disorder: No   Maternal metabolic disorder (eg. Type 1 diabetes, PKU): No   Patient or baby's father had child with birth defects not listed above: No   Recurrent pregnancy loss, or a stillbirth: No   Medications (including supplements, vitamins, herbs, or OTC drugs)/illicit/recreational drugs/alcohol since last menstrual period: No           Maternal Age Based Aneuploidy Risk based on being 30 years of age at Estimated Date of Delivery: 04/05/2021          1 in 741 chance for a live birth with Down syndrome        1 in 385 chance for a live birth with any maternal age related chromosomal abnormality.      To date, Sherita Chavez has undergone the following screening:    First Trimester Ultrasound (09/15/2020)   Gestational Age: 13w1   CRL: 68mm   NT: not recorded   ARIA: 04/05/2021   Additional Findings: None noted    Non Invasive Carrier Screen    Patient reports NIPT was negative. Report not available at time of appointment.     Genetic Carrier Screen    Lab: [facility]    Test: Cystic Fibrosis Mutation 97    Specimen ID: [id number]    Result: Negative     Residual Risk: 1 in 343     Genetic Carrier Screen    Lab: [facility]    Test: SMN1 Copy Number Analysis    Specimen ID: [id number]    Result: POSITIVE Carrier- 1 copy of SMN1 detected        Of note, Sherita Chavez's partner had carrier screening for Spinal Muscular Atrophy (SMA) and was identified to have 3 copies of SMN1 ([facility] specimen #[id number]). Therefore, the likelihood of the pregnancy being affected with SMA is greatly reduced. Sherita Chavez presents today to review the genetics of SMA, and discuss  diagnostic testing.    Family History  A detailed three-generation family history was obtained, including patient and partner ethnicity.  Significant findings include:  - Sherita Chavez's brother is reportedly diagnosed with a heart murmur and her father has reportedly been diagnosed with atrial fibrillation. See plan for recommendations.  - Sherita Chavez's maternal uncle has a stillbirth due to an unknown cause.   - Sherita Chavez's maternal aunt had breast cancer at age 43. See plan for recommendations.  - Sherita Chavez's partner's sister has seizures resulting from a brain mass that was removed in childhood. She has seizures to this present day, has intellectual disability and cannot live independently.  - Sherita Chavez's partner's father reportedly has atrial fibrillation and congestive heart failure. See plan for recommendations.        Genetic Education/Risk Assessment/Counseling/Genetic Test Options:  Information was provided about risks during pregnancy based on the information available.  It was reviewed that in every pregnancy there is a 3-5% chance to have a baby with a congenital birth defect.  The basis of genetics, natural history, risks and inheritance patterns were presented as relevant to this case.  Related psychosocial aspects were discussed.    It was discussed that every pregnancy, regardless of maternal age, has a 1.6% risk for a copy number variant (CNV) of probable clinical significance (Steven, 2014).  CNVs are structural variants in the genome (deletions and/or duplications of specific DNA regions) that can be inherited or occur by chance (de sesar). Given this risk, diagnostic testing can be considered for every pregnancy, but is optional.    Spinal Muscular Atrophy:  Spinal Muscular Atrophy (SMA) is an autosomal recessive condition and is the most common fatal disorder in infancy. Werdnig-Parks disease is the most frequent subtype of SMA (Type 1). Individuals with Type  "1 are not able to sit up themselves, have low muscle tone and experience difficulty with sucking, swallowing and respiratory difficulties. Some newborns will require intubation secondary to respiratory failure at birth. Infants with SMA may show hypotonia, minimal spontaneous movements and have no suck, gag, or grasp reflexes. Without treatment, the average lifespan for an individual with Type 1 is two-years old.    Ninety-Five percent of individuals with SMA have 0 copies of the SMN1 gene. Five percent of affected individuals have one SMN1 gene which has a point mutation. Rarely, an affected individual may have two SMN1 copies, each with a point mutation.    There is no cure for SMA but effective treatment options exist. Spinraza is the first and currently only FDA approved treatment for SMA. Spinraza is a gene therapy which targets the SMN2 gene, causing it to make more complete protein. The goal of Spinraza is to correct splicing errors. Spinraza is administered through intrathecal injections; 4 loading doses are given in the first two-months of treatment followed by maintenance doses every four months. Treatment with Spinraza should begin as soon as diagnosis is made and is lifelong. Additional treatment options for SMA are being researched and parents may elect to enroll their child in clinical trials.    Carriers of SMA have one SMN1 copy and typically are asymptomatic. Silent carriers are individuals with two SMN1 gene mutations in \"cis\" (both SMN1 copies are on one chromosome, leaving the other homologous chromosome without a copy).  Silent carriers are not identified on all carrier screening platforms. Cis configuration occurs more frequently in the presence of single nucleotide polymorphisms (SNPs - the most common type of genetic variation among people with each SNP representing a difference in a single DNA building block, called a nucleotide). Ashkenazi Jew and  populations are more likely to " have these SNPs and therefore be silent carriers. However, SNPs may occur in other ethnicities. The risk of being a silent carrier of SMA after two SMN1 genes are identified in the presence of a SNP is 1 in 29 for Caucasians.    Carriers of Spinal Muscular Atrophy are asymptomatic. When both parents are carriers of SMA, there is a 1 in 4 (25%) chance to have an affected offspring. A negative carrier screen greatly reduces the chance of being a carrier but does not eliminate the chance.    The risk for hSerita Chavez and her partner to have a pregnancy affected by Spinal Muscular Atrophy is:    1 in 22,400.  This is based on:    1 / 2 chance Sherita Chavez would not pass down a copy of the SMN1 gene   1 / 5,600 chance that Sherita Chavez's partner is still a carrier after a negative screen based on  ethnicity   1 / 2 chance Sherita Chavez's partner would not pass down a copy of the SMN1 gene if he is a carrier for SMA    Amniocentesis:  An amniocentesis is an invasive, diagnostic procedure used to collect amniotic fluid, which contains fetal cells. Nationally, there is around a 1 in 400 risk for miscarriage due to an amniocentesis. Through this procedure, the fetal cells can be tested for genetic abnormalities including but not limited to aneuploidy, structural chromosomal rearrangements, copy number variants, and single gene disorders. The amniotic fluid can also be tested to determine if an open neural tube defect may be present.    Patient Decisions Summary:  Based upon information discussed today, Sherita Chavez is not pursuing any further testing at this time.    Plan:  Sherita Chavez was confirmed to have understood the aforementioned information and was assisted with decision making as needed.  Informational and supportive resources were provided.  Consent was obtained to share chart note(s) with physicians.  Sherita Chavez will be contacted via telephone when genetic test results  are available.  Sherita Chavez should contact the program with person/family history updates and/or to inquire about new information specific to this case.  The information provided reflects current practice guidelines and may change with new medical discoveries/technology/updated personal or family history information.    Based on the reported history of significant cancer in the family, consideration of cancer genetics evaluation is suggested. More information is available at www.[email or web address] or by calling [employer]100.GENE ([phone]).    Based on the reported history of significant cardiovascular conditions in the family, consideration of cardiovascular genetics evaluation is suggested. More information is available at www.[employer] or by calling 601.931.GENE ([phone]).    A total of 60 minutes was spent providing genetic counseling to Sherita Chavez

## 2020-11-04 NOTE — RESULT ENCOUNTER NOTE
I had signed off on this on 10/20, but it was not processed until today and I sent a message through the portal system to the patient after she had reached out to me

## 2020-11-12 ENCOUNTER — TELEPHONE (OUTPATIENT)
Dept: FAMILY MEDICINE | Facility: CLINIC | Age: 30
End: 2020-11-12

## 2020-11-12 NOTE — TELEPHONE ENCOUNTER
Please call patient and get her most recent Gyn information. Call Gyn and have them fax over the most recent Pap.

## 2020-11-16 ENCOUNTER — HOSPITAL ENCOUNTER (OUTPATIENT)
Dept: PERINATAL CARE | Facility: HOSPITAL | Age: 30
Discharge: HOME | End: 2020-11-16
Attending: NURSE PRACTITIONER
Payer: COMMERCIAL

## 2020-11-16 DIAGNOSIS — E05.90 HYPERTHYROIDISM, MATERNAL, ANTEPARTUM, SECOND TRIMESTER: ICD-10-CM

## 2020-11-16 DIAGNOSIS — O35.9XX0 SUSPECTED FETAL ABNORMALITY AFFECTING MANAGEMENT OF MOTHER, ANTEPARTUM, SINGLE OR UNSPECIFIED FETUS: ICD-10-CM

## 2020-11-16 DIAGNOSIS — O09.92 ENCOUNTER FOR SUPERVISION OF HIGH RISK PREGNANCY IN SECOND TRIMESTER, ANTEPARTUM: ICD-10-CM

## 2020-11-16 DIAGNOSIS — O99.282 HYPERTHYROIDISM, MATERNAL, ANTEPARTUM, SECOND TRIMESTER: ICD-10-CM

## 2020-11-16 DIAGNOSIS — Z3A.20 20 WEEKS GESTATION OF PREGNANCY: ICD-10-CM

## 2020-11-16 DIAGNOSIS — Z36.89 ENCOUNTER FOR OTHER SPECIFIED ANTENATAL SCREENING: ICD-10-CM

## 2020-11-16 DIAGNOSIS — Z36.3 ANTENATAL SCREENING FOR MALFORMATION USING ULTRASONICS: ICD-10-CM

## 2020-11-16 DIAGNOSIS — Z36.86 ENCOUNTER FOR ANTENATAL SCREENING FOR CERVICAL LENGTH: ICD-10-CM

## 2020-11-16 PROCEDURE — 76811 OB US DETAILED SNGL FETUS: CPT

## 2020-11-16 RX ORDER — LEVOTHYROXINE SODIUM 88 UG/1
88 TABLET ORAL
Qty: 90 TABLET | Refills: 1 | Status: SHIPPED | OUTPATIENT
Start: 2020-11-16

## 2020-11-16 NOTE — TELEPHONE ENCOUNTER
Sherita called to inform you that she did have her last PAP in August with [facility] in [place]. I will put in a request for those records to update the chart.

## 2020-11-18 DIAGNOSIS — Z00.00 PREVENTATIVE HEALTH CARE: Primary | ICD-10-CM

## 2020-11-19 NOTE — PROGRESS NOTES
" Saumya Lyons MD, [facility]  Cardiology    [facility] HEART GROUP    [facility]  [facility] [address]  [address] [place] [address]    TEL  [phone]  [facility]     November 20, 2020    Reason for visit: Cardiovascular consultation     Sherita Chavez is a 30 y.o. female who presents for cardiovascular consultation. History was obtained by extensive chart review and by patient.     Sherita saw Dr. Naif Cook on October 5, 2020 for palpitations and remote MVP diagnosed at age 10, SVT in 2007, and chronic PVCs. At that visit she was 14 weeks pregnant noted increasing frequency, intensity, and duration of palpations.     Sherita is 20 weeks pregnant today. She is feeling significantly better. Her palpitations are back to baseline. She has 3 types of palpitations. She gets a \"butterfly\" sensation and a \"skipped beat\" sensation a few times weekly which is her normal. She rarely gets a \"fibrillation\" sensation and has not experienced this since her heart monitor. Her morning sickness has resolved and she is better hydrated compared to her first trimester. She would like to deliver at the Birthing Center in [place].    She denies chest discomfort, shortness of breath, pre-syncope, syncope, or lower extremity edema.    Sherita had an echocardiogram on October 2, 2020 which showed normal left ventricular function and no evidence of mitral valve prolapse. She wore a 10 day event monitor which was notable for 2 runs of non-sustained SVT one lasting 6 beats and the second lasting 7 beats.     In 2007 she had the abrupt onset of severe palpitations at night and could really catch her breath on went to the emergency room in [place]. Per Sherita, she was diagnosed with pneumonia and SVT and received Adenosine twice which did not break her SVT. She was given IV fluids and IV antibiotics and states her heart rate came down to around 120 bpm and she was discharged. " and had SVT and received adenosine.  She has had no further recurrence of the SVT since then.        Past Medical History:  1. Mitral valve prolapse  2. Supraventricular tachycardia  3. Premature ventricular contractions  4. Anemia- transient   5. Hashimoto's disease  6. Hypothryoidism  7. OCD  8. Irritable bowel syndrome  9. Scoliosis  10. Gastroparesis - resolved with diet change  11. Obstetrics History     Past Surgical History:  1. [place] tooth extration  2. Skin biopsy     Medications: Synthroid    Allergies: Amoxicillin, Moxifloxacin, Sulfa (sulfonamide antibiotics), Ciprofloxacin, Ciprofloxacin hcl, Moxifloxacin hcl, and Sulfamethoxazole-trimethoprim    Social History:  She is  to her  [place]. JESÚS at [employer] on the [facility] floor. She is vegan. Never smoker. Denies alcohol and drug use.     Family History:  Mother is alive with hyperlipidemia.  Dad is alive and has atrial fibrillation.  Paternal grandfather with atrial fibrillation.  She has 2 brothers.  One might of had a heart murmur.      Review of Systems   Constitution: Negative for chills and fever.   HENT: Negative for ear pain and sore throat.    Eyes: Negative for vision loss in left eye and vision loss in right eye.   Cardiovascular: Negative for chest pain and dyspnea on exertion.   Respiratory: Negative for cough.    Endocrine: Negative for cold intolerance and heat intolerance.   Hematologic/Lymphatic: Negative for adenopathy and bleeding problem.   Skin: Negative for itching and rash.   Musculoskeletal: Negative for arthritis and joint pain.   Gastrointestinal: Negative for nausea and vomiting.   Genitourinary: Negative for dysuria and hematuria.   Neurological: Negative for dizziness and light-headedness.   Psychiatric/Behavioral: Negative for memory loss. The patient does not have insomnia.    Allergic/Immunologic: Negative for environmental allergies.       Exam:  Objective   Vitals:    20 0946   BP: 122/74    Pulse: 84   SpO2: 99%     Body mass index is 26.61 kg/m².  Physical Exam   Constitutional: She is oriented to person, place, and time. She appears well-developed and well-nourished.   HENT:   Head: Normocephalic and atraumatic.   Eyes: No scleral icterus.   Neck: No JVD present.   Cardiovascular: Normal rate and regular rhythm.   Pulmonary/Chest: She has no wheezes. She has no rales.   Abdominal: Soft. She exhibits no distension.   Musculoskeletal:         General: No edema.   Neurological: She is alert and oriented to person, place, and time.   Skin: Skin is warm and dry.   Psychiatric: She has a normal mood and affect. Her behavior is normal.       Labs:  Lab Results   Component Value Date    WBC 8.7 11/20/2020    HGB 11.8 11/20/2020     11/20/2020    CHOL 211 (H) 11/20/2020    TRIG 95 11/20/2020    HDL 68 11/20/2020    ALT 21 11/20/2020    AST 26 11/20/2020     11/20/2020    K 4.1 11/20/2020    CREATININE 0.49 (L) 11/20/2020    TSH 1.900 06/08/2020     Personally reviewed, my interpretation: normal thyroid function    Cardiovascular Studies:   Echocardiogram 10/02/2020: The patient had a high heart rate for 1 to 2 minutes and the tracing showed sinus rhythm. Normal-sized LV. Normal LV wall thickness. Preserved LV systolic function. Estimated EF 60- 65%. Normal diastolic filling pattern for age of the LV. Normal-sized RV. Normal RV systolic function. Normal-sized LA. Normal doppler flow of pulmonary veins. Normal-sized RA. Aortic root normal. Aortic valve structure is normal. Tricuspid aortic valve. Normal leaflet structure of the mitral valve. There is no evidence of MVP. Trace mitral valve regurgitation. Trace tricuspid valve regurgitation. Estimated RVSP = 19 mmHg. Normal-sized IVC. IVC demonstrates normal respiratory collapse. Normal hepatic vein flow.No evidence of pericardial effusion.    ECG from today personally reviewed and discussed with the patient shows sinus rhythm at 84  bpm    Assessment/Plan   Problem List Items Addressed This Visit        Circulatory    Mitral valve prolapse     Diagnosed at age 10. Recent echocardiogram Oct 2020 with no evidence of prolapse or significant mitral regurgitation         PSVT (paroxysmal supraventricular tachycardia) (CMS/[facility])     Reported history of SVT that did not respond to adenosine in 2007 when she had concurrent pneumonia. Cardiac event monitor in Oct 2020 showed two runs of non-sustained SVT, one lasting 6 beats and the second 7 beats.   --Discussed that arrhythmias and palpitations are most common in the first and third trimester   --She has records from her hospital admission in 2007 and will send the information to me  --Due to history of SVT, I recommended she deliver in the hospital rather than at a birthing center in case she has SVT surrounding delivery.         PVC (premature ventricular contraction)     History of PVCs. Echocardiogram Oct 2020 with normal LV function  --No PVCs noted on ECG today in office         Palpitations - Primary     Chronic palpitations which worsened during first trimester of pregnancy. Now back to baseline  --Discussed with patient how the first trimester and the third trimester, particularly around 28-32 weeks are the most common time to feel palpitations         Relevant Orders    ECG 12 lead (Completed)       Other    20 weeks gestation of pregnancy     Long discussion with the patient surrounding delivery in the hospital versus outpatient birthing center.  She would strongly prefer the birthing center, however I raise concern that should she develop supraventricular tachycardia at a rapid rate with any hemodynamic instability this would be quite dangerous to the fetus.  SVT surrounding delivery is common in women with a history of SVT raising this concern.  Dr. Lyons left a message for her midwife this morning to discuss further.                  This letter was generated using speech recognition  software. Please excuse any typographical errors.    Return in about 12 weeks (around 2/12/2021).

## 2020-11-20 ENCOUNTER — TELEPHONE (OUTPATIENT)
Dept: PRIMARY CARE | Facility: CLINIC | Age: 30
End: 2020-11-20

## 2020-11-20 ENCOUNTER — OFFICE VISIT (OUTPATIENT)
Dept: CARDIOLOGY | Facility: CLINIC | Age: 30
End: 2020-11-20
Payer: COMMERCIAL

## 2020-11-20 VITALS
BODY MASS INDEX: 26.46 KG/M2 | HEIGHT: 64 IN | WEIGHT: 155 LBS | HEART RATE: 84 BPM | SYSTOLIC BLOOD PRESSURE: 122 MMHG | OXYGEN SATURATION: 99 % | DIASTOLIC BLOOD PRESSURE: 74 MMHG

## 2020-11-20 DIAGNOSIS — I47.10 PSVT (PAROXYSMAL SUPRAVENTRICULAR TACHYCARDIA) (CMS/HCC): ICD-10-CM

## 2020-11-20 DIAGNOSIS — I34.1 MITRAL VALVE PROLAPSE: ICD-10-CM

## 2020-11-20 DIAGNOSIS — Z3A.20 20 WEEKS GESTATION OF PREGNANCY: ICD-10-CM

## 2020-11-20 DIAGNOSIS — I49.3 PVC (PREMATURE VENTRICULAR CONTRACTION): ICD-10-CM

## 2020-11-20 DIAGNOSIS — R00.2 PALPITATIONS: Primary | ICD-10-CM

## 2020-11-20 PROCEDURE — 93000 ELECTROCARDIOGRAM COMPLETE: CPT | Performed by: INTERNAL MEDICINE

## 2020-11-20 PROCEDURE — 99215 OFFICE O/P EST HI 40 MIN: CPT | Performed by: INTERNAL MEDICINE

## 2020-11-20 ASSESSMENT — ENCOUNTER SYMPTOMS
DIZZINESS: 0
HEMATURIA: 0
DYSURIA: 0
RIGHT EYE: 0
SORE THROAT: 0
NAUSEA: 0
DYSPNEA ON EXERTION: 0
INSOMNIA: 0
COUGH: 0
FEVER: 0
VOMITING: 0
CHILLS: 0
LIGHT-HEADEDNESS: 0
LEFT EYE: 0
ADENOPATHY: 0
MEMORY LOSS: 0

## 2020-11-20 NOTE — ASSESSMENT & PLAN NOTE
Diagnosed at age 10. Recent echocardiogram Oct 2020 with no evidence of prolapse or significant mitral regurgitation

## 2020-11-20 NOTE — LETTER
November 22, 2020                                                                                                                                                                                                                                                                                                                   Patient: Sherita Chavez   YOB: 1990   Date of Visit: 11/20/2020       Dear Jenn:    Thank you for the opportunity of seeing your patient, Sherita Chavez, today, 11/20/2020. Following is a summary of today's visit and my recommendation(s).  I called the [facility] center at 9am on 11/20/20 to discuss her care. Please call to discuss plans further. Thank you.    Thank you for allowing us to participate in Sherita's care.  Please feel free to contact me with any questions.      Assessment / Plan:  Problem List Items Addressed This Visit        Circulatory    Mitral valve prolapse     Diagnosed at age 10. Recent echocardiogram Oct 2020 with no evidence of prolapse or significant mitral regurgitation         PSVT (paroxysmal supraventricular tachycardia) (CMS/[facility])     Reported history of SVT that did not respond to adenosine in 2007 when she had concurrent pneumonia. Cardiac event monitor in Oct 2020 showed two runs of non-sustained SVT, one lasting 6 beats and the second 7 beats.   --Discussed that arrhythmias and palpitations are most common in the first and third trimester   --She has records from her hospital admission in 2007 and will send the information to me  --Due to history of SVT, I recommended she deliver in the hospital rather than at a birthing center in case she has SVT surrounding delivery.         PVC (premature ventricular contraction)     History of PVCs. Echocardiogram Oct 2020 with normal LV function  --No PVCs noted on ECG today in office         Palpitations - Primary     Chronic palpitations which worsened during first trimester of pregnancy. Now back to  baseline  --Discussed with patient how the first trimester and the third trimester, particularly around 28-32 weeks are the most common time to feel palpitations         Relevant Orders    ECG 12 lead (Completed)       Other    20 weeks gestation of pregnancy     Long discussion with the patient surrounding delivery in the hospital versus outpatient birthing center.  She would strongly prefer the birthing center, however I raise concern that should she develop supraventricular tachycardia at a rapid rate with any hemodynamic instability this would be quite dangerous to the fetus.  SVT surrounding delivery is common in women with a history of SVT raising this concern.  Dr. Lyons left a message for her midwife this morning to discuss further.                      Sincerely,      Saumya Lyons MD    CC: Tanisha Plummer MD

## 2020-11-20 NOTE — ASSESSMENT & PLAN NOTE
History of PVCs. Echocardiogram Oct 2020 with normal LV function  --No PVCs noted on ECG today in office

## 2020-11-20 NOTE — ASSESSMENT & PLAN NOTE
Reported history of SVT that did not respond to adenosine in 2007 when she had concurrent pneumonia. Cardiac event monitor in Oct 2020 showed two runs of non-sustained SVT, one lasting 6 beats and the second 7 beats.   --Discussed that arrhythmias and palpitations are most common in the first and third trimester   --She has records from her hospital admission in 2007 and will send the information to me  --Due to history of SVT, I recommended she deliver in the hospital rather than at a birthing center in case she has SVT surrounding delivery.

## 2020-11-21 ENCOUNTER — TELEPHONE (OUTPATIENT)
Dept: PRIMARY CARE | Facility: CLINIC | Age: 30
End: 2020-11-21

## 2020-11-21 LAB
ALBUMIN SERPL-MCNC: 3.5 G/DL (ref 3.8–4.8)
ALBUMIN/GLOB SERPL: 1.5 {RATIO} (ref 1.2–2.2)
ALP SERPL-CCNC: 69 IU/L (ref 39–117)
ALT SERPL-CCNC: 21 IU/L (ref 0–32)
AST SERPL-CCNC: 26 IU/L (ref 0–40)
BASOPHILS # BLD AUTO: 0 X10E3/UL (ref 0–0.2)
BASOPHILS NFR BLD AUTO: 1 %
BILIRUB SERPL-MCNC: 0.2 MG/DL (ref 0–1.2)
BUN SERPL-MCNC: 4 MG/DL (ref 6–20)
BUN/CREAT SERPL: 8 (ref 9–23)
CALCIUM SERPL-MCNC: 8.8 MG/DL (ref 8.7–10.2)
CHLORIDE SERPL-SCNC: 104 MMOL/L (ref 96–106)
CHOLEST SERPL-MCNC: 211 MG/DL (ref 100–199)
CO2 SERPL-SCNC: 21 MMOL/L (ref 20–29)
CREAT SERPL-MCNC: 0.49 MG/DL (ref 0.57–1)
EOSINOPHIL # BLD AUTO: 0.2 X10E3/UL (ref 0–0.4)
EOSINOPHIL NFR BLD AUTO: 2 %
ERYTHROCYTE [DISTWIDTH] IN BLOOD BY AUTOMATED COUNT: 12.8 % (ref 11.7–15.4)
GLOBULIN SER CALC-MCNC: 2.4 G/DL (ref 1.5–4.5)
GLUCOSE SERPL-MCNC: 84 MG/DL (ref 65–99)
HCT VFR BLD AUTO: 35.2 % (ref 34–46.6)
HCV AB S/CO SERPL IA: <0.1 S/CO RATIO (ref 0–0.9)
HDLC SERPL-MCNC: 68 MG/DL
HGB BLD-MCNC: 11.8 G/DL (ref 11.1–15.9)
HIV 1+2 AB+HIV1 P24 AG SERPL QL IA: NON REACTIVE
IMM GRANULOCYTES # BLD AUTO: 0.1 X10E3/UL (ref 0–0.1)
IMM GRANULOCYTES NFR BLD AUTO: 1 %
LAB CORP EGFR IF AFRICN AM: 150 ML/MIN/1.73
LAB CORP EGFR IF NONAFRICN AM: 130 ML/MIN/1.73
LDLC SERPL CALC-MCNC: 126 MG/DL (ref 0–99)
LYMPHOCYTES # BLD AUTO: 1.4 X10E3/UL (ref 0.7–3.1)
LYMPHOCYTES NFR BLD AUTO: 16 %
MCH RBC QN AUTO: 31.6 PG (ref 26.6–33)
MCHC RBC AUTO-ENTMCNC: 33.5 G/DL (ref 31.5–35.7)
MCV RBC AUTO: 94 FL (ref 79–97)
MONOCYTES # BLD AUTO: 0.9 X10E3/UL (ref 0.1–0.9)
MONOCYTES NFR BLD AUTO: 10 %
NEUTROPHILS # BLD AUTO: 6.1 X10E3/UL (ref 1.4–7)
NEUTROPHILS NFR BLD AUTO: 70 %
PLATELET # BLD AUTO: 258 X10E3/UL (ref 150–450)
POTASSIUM SERPL-SCNC: 4.1 MMOL/L (ref 3.5–5.2)
PROT SERPL-MCNC: 5.9 G/DL (ref 6–8.5)
RBC # BLD AUTO: 3.74 X10E6/UL (ref 3.77–5.28)
SODIUM SERPL-SCNC: 136 MMOL/L (ref 134–144)
TRIGL SERPL-MCNC: 95 MG/DL (ref 0–149)
VLDLC SERPL CALC-MCNC: 17 MG/DL (ref 5–40)
WBC # BLD AUTO: 8.7 X10E3/UL (ref 3.4–10.8)

## 2020-11-21 RX ORDER — LEVOTHYROXINE SODIUM 88 UG/1
88 TABLET ORAL
Qty: 30 TABLET | Refills: 0 | Status: ON HOLD | OUTPATIENT
Start: 2020-11-21 | End: 2021-03-16 | Stop reason: SDUPTHER

## 2020-11-21 NOTE — TELEPHONE ENCOUNTER
Patient called service to [facility] med RF. States recent RF sent to wrong RX. Called x 3 - LVM third time. Did not answer or return call. No med specifications reported, so no RF sent. Asked patient to call back if med need was urgent.

## 2020-11-22 ENCOUNTER — TELEPHONE (OUTPATIENT)
Dept: PRIMARY CARE | Facility: CLINIC | Age: 30
End: 2020-11-22

## 2020-11-22 NOTE — ASSESSMENT & PLAN NOTE
Long discussion with the patient surrounding delivery in the hospital versus outpatient birthing center.  She would strongly prefer the birthing center, however I raise concern that should she develop supraventricular tachycardia at a rapid rate with any hemodynamic instability this would be quite dangerous to the fetus.  SVT surrounding delivery is common in women with a history of SVT raising this concern.  Dr. Lyons left a message for her midwife this morning to discuss further.

## 2020-11-22 NOTE — TELEPHONE ENCOUNTER
Patient called the service again on 11/21/2020 requesting med refill for Synthroid 88 mcg daily.  She states she had requested the medication refill during the week, but medication was not sent.  She states she is pregnant and it is very important that she keep her thyroid under control.  She is asymptomatic at this time.  She confirms the dose is levothyroxine 88 mcg daily, but states brand is necessary.  Refill sent to the appropriate pharmacy and patient states she will call me back if there are any issues.

## 2020-11-23 NOTE — RESULT ENCOUNTER NOTE
Andrews Magallon,     All of your labs look good.  Let me know if you have any questions.     Thanks,   Dr. Plummer

## 2020-11-27 ENCOUNTER — TELEPHONE (OUTPATIENT)
Dept: SCHEDULING | Facility: CLINIC | Age: 30
End: 2020-11-27

## 2020-12-03 DIAGNOSIS — N94.10 DYSPAREUNIA IN FEMALE: Primary | ICD-10-CM

## 2020-12-21 ENCOUNTER — TELEPHONE (OUTPATIENT)
Dept: CARDIOLOGY | Facility: CLINIC | Age: 30
End: 2020-12-21

## 2020-12-21 NOTE — TELEPHONE ENCOUNTER
----- Message from August Balbuena sent at 12/21/2020  2:32 PM EST -----  Regarding: Third Opinion  Good Afternoon    The patient above states she has seen both Dr Lyons and Dr Alvarez. Patient states she has gotten two separate opinions from both providers. Patient has requested to speak with an overseer for both providers to find out why she would be getting two separate opinions from two providers of the same practice. Pt can be reached back at [phone]

## 2020-12-21 NOTE — TELEPHONE ENCOUNTER
Returned patient call. She asked if Unique from the birthing center reached out. She did not. She says Dr. Alvarez consulted on ANOTHER patient who he said could deliver in the birthing center. I of course advised I cannot comment on another patient's care. She asked I reach out to Unique. Left a message at the birthing center for Unique.   She was clearly agitated as I have recommended she deliver at the hospital with her midwife so she will be safe should she develop unstable SVT which could decrease placental perfusion.   I await a call back from Unique.

## 2020-12-21 NOTE — TELEPHONE ENCOUNTER
Message left after receiving this message, requesting a call back. I do not see that she saw Dr. Alvarez.  She did see Dr. Cook. We did not give differing opinions as best I am aware so I am not sure what she is referring to. Asked she call me back to clarify. She is welcome to seek another opinion.

## 2020-12-22 NOTE — TELEPHONE ENCOUNTER
Spoke with Unique, midwife.  Read cardiology recommendation.   as I have recommended she deliver at the hospital with her midwife so she will be safe should she develop unstable SVT which could decrease placental perfusion.   I await a call back from Unique.          Documentation    Unique wanted to understand how two doctors from same practice could have difference   of opinion, explained cannot comment on another patient or physician recommendation.  Only give recommendation for Sherita and baby from her cardiologist.

## 2020-12-22 NOTE — TELEPHONE ENCOUNTER
Unique from [facility] is pt's midwife and is  requesting to speak with Dr Lyons.  Unique can be reached back at [phone]

## 2020-12-22 NOTE — TELEPHONE ENCOUNTER
Spoke with Eula. She was questioning why my opinion and that of another cardiologist in my group have differed. I stressed that I cannot comment on the other patient as I am not involved in their clinical care, however make my recommendations based on available data in the cardio obstetrics literature. We discussed that although the risks are low, patients with history of SVT do you have an increased risk of adverse maternal and fetal  outcomes. This is why the recommendation has been to deliver in the hospital setting. I have again reassured that I certainly do not recommend any invasive or even telemetry monitoring. I was able to review available records from 2007 which are hand written and largely illegible, however make note of rapid heart rate to 150 bpm with associated dizziness and lightheadedness. This suggests hemodynamic instability, although difficult to as certain from documentation. Although the risks may be low, I would prefer a conservative plan to ensure safety of mom and baby. She is welcome to seek another opinion which I would encourage if she is not satisfied with my recommendations. I have given her the names of other cardiologists who specialize in cardio obstetrics in the region.

## 2021-01-06 ENCOUNTER — TELEPHONE (OUTPATIENT)
Dept: CARDIOLOGY | Facility: CLINIC | Age: 31
End: 2021-01-06

## 2021-01-11 ENCOUNTER — TRANSCRIBE ORDERS (OUTPATIENT)
Dept: SCHEDULING | Age: 31
End: 2021-01-11

## 2021-01-11 DIAGNOSIS — E06.3 AUTOIMMUNE THYROIDITIS: ICD-10-CM

## 2021-01-11 DIAGNOSIS — Z34.02 ENCOUNTER FOR SUPERVISION OF NORMAL FIRST PREGNANCY, SECOND TRIMESTER: Primary | ICD-10-CM

## 2021-01-13 ENCOUNTER — TELEPHONE (OUTPATIENT)
Dept: SCHEDULING | Facility: CLINIC | Age: 31
End: 2021-01-13

## 2021-01-13 NOTE — TELEPHONE ENCOUNTER
Medical Records Request     Name of caller: Sherita Chavez      Relationship to patient: Sherita Chavez      Who’s requesting copy of medical records? [facility]    Records being requested: Last echo, ekg and holter monitor report     Fax number: [phone]    Best contact number: [phone]     Last ech0 has been E-Faxed

## 2021-01-13 NOTE — TELEPHONE ENCOUNTER
Pt calling stating that [facility] did not receive all records.     Please Refax last EKG and Holter Monitor results to [phone]    Sherita can be reached at [phone]

## 2021-02-09 ENCOUNTER — HOSPITAL ENCOUNTER (OUTPATIENT)
Dept: PERINATAL CARE | Facility: HOSPITAL | Age: 31
Discharge: HOME | End: 2021-02-09
Attending: ADVANCED PRACTICE MIDWIFE
Payer: COMMERCIAL

## 2021-02-09 DIAGNOSIS — Z3A.32 32 WEEKS GESTATION OF PREGNANCY: ICD-10-CM

## 2021-02-09 DIAGNOSIS — Z34.02 ENCOUNTER FOR SUPERVISION OF NORMAL FIRST PREGNANCY, SECOND TRIMESTER: ICD-10-CM

## 2021-02-09 DIAGNOSIS — O99.283 THYROID DISEASE DURING PREGNANCY IN THIRD TRIMESTER: ICD-10-CM

## 2021-02-09 DIAGNOSIS — E06.3 AUTOIMMUNE THYROIDITIS: ICD-10-CM

## 2021-02-09 DIAGNOSIS — O09.93 SUPERVISION OF HIGH RISK PREGNANCY IN THIRD TRIMESTER: ICD-10-CM

## 2021-02-09 DIAGNOSIS — E07.9 THYROID DISEASE DURING PREGNANCY IN THIRD TRIMESTER: ICD-10-CM

## 2021-02-09 PROCEDURE — 76816 OB US FOLLOW-UP PER FETUS: CPT

## 2021-02-26 ENCOUNTER — TRANSCRIBE ORDERS (OUTPATIENT)
Dept: SCHEDULING | Age: 31
End: 2021-02-26

## 2021-02-26 DIAGNOSIS — E06.3 AUTOIMMUNE THYROIDITIS: Primary | ICD-10-CM

## 2021-02-26 DIAGNOSIS — Z34.03 ENCOUNTER FOR SUPERVISION OF NORMAL FIRST PREGNANCY, THIRD TRIMESTER: ICD-10-CM

## 2021-03-10 LAB
GP B STREP SPEC QL CULT: NORMAL
QST CHLAMYDIA TRACHOMATIS RNA, TMA: NEGATIVE
QST NEISSERIA GONORRHOEAE RNA, TMA: NEGATIVE

## 2021-03-16 ENCOUNTER — HOSPITAL ENCOUNTER (OUTPATIENT)
Facility: HOSPITAL | Age: 31
Setting detail: OBSERVATION
Discharge: HOME | End: 2021-03-16
Attending: OBSTETRICS & GYNECOLOGY | Admitting: OBSTETRICS & GYNECOLOGY
Payer: COMMERCIAL

## 2021-03-16 VITALS
WEIGHT: 184 LBS | BODY MASS INDEX: 31.41 KG/M2 | OXYGEN SATURATION: 99 % | HEIGHT: 64 IN | HEART RATE: 121 BPM | SYSTOLIC BLOOD PRESSURE: 123 MMHG | TEMPERATURE: 98.9 F | DIASTOLIC BLOOD PRESSURE: 78 MMHG | RESPIRATION RATE: 18 BRPM

## 2021-03-16 DIAGNOSIS — I49.3 PVC (PREMATURE VENTRICULAR CONTRACTION): ICD-10-CM

## 2021-03-16 DIAGNOSIS — I47.10 PSVT (PAROXYSMAL SUPRAVENTRICULAR TACHYCARDIA) (CMS/HCC): Primary | ICD-10-CM

## 2021-03-16 PROBLEM — R00.0 SINUS TACHYCARDIA: Status: ACTIVE | Noted: 2021-03-16

## 2021-03-16 PROBLEM — O16.3 ELEVATED BLOOD PRESSURE AFFECTING PREGNANCY IN THIRD TRIMESTER, ANTEPARTUM: Status: ACTIVE | Noted: 2021-03-16

## 2021-03-16 LAB
ABO + RH BLD: NORMAL
ALBUMIN SERPL-MCNC: 3 G/DL (ref 3.4–5)
ALP SERPL-CCNC: 173 IU/L (ref 35–126)
ALT SERPL-CCNC: 56 IU/L (ref 11–54)
ALT SERPL-CCNC: 56 IU/L (ref 11–54)
ANION GAP SERPL CALC-SCNC: 9 MEQ/L (ref 3–15)
AST SERPL-CCNC: 42 IU/L (ref 15–41)
AST SERPL-CCNC: 43 IU/L (ref 15–41)
BILIRUB SERPL-MCNC: 0.5 MG/DL (ref 0.3–1.2)
BLD GP AB SCN SERPL QL: NEGATIVE
BUN SERPL-MCNC: <5 MG/DL (ref 8–20)
CALCIUM SERPL-MCNC: 9.8 MG/DL (ref 8.9–10.3)
CHLORIDE SERPL-SCNC: 107 MEQ/L (ref 98–109)
CO2 SERPL-SCNC: 21 MEQ/L (ref 22–32)
CREAT SERPL-MCNC: 0.4 MG/DL (ref 0.6–1.1)
CREAT SERPL-MCNC: 0.6 MG/DL (ref 0.6–1.1)
CREAT UR-MCNC: 15.7 MG/DL
D AG BLD QL: POSITIVE
ERYTHROCYTE [DISTWIDTH] IN BLOOD BY AUTOMATED COUNT: 15.7 % (ref 11.7–14.4)
GFR SERPL CREATININE-BSD FRML MDRD: >60 ML/MIN/1.73M*2
GFR SERPL CREATININE-BSD FRML MDRD: >60 ML/MIN/1.73M*2
GLUCOSE SERPL-MCNC: 77 MG/DL (ref 70–99)
HCT VFR BLDCO AUTO: 40.2 % (ref 35–45)
HGB BLD-MCNC: 13.3 G/DL (ref 11.8–15.7)
LABORATORY COMMENT REPORT: NORMAL
LDH SERPL L TO P-CCNC: 154 IU/L (ref 98–192)
MCH RBC QN AUTO: 31.4 PG (ref 28–33.2)
MCHC RBC AUTO-ENTMCNC: 33.1 G/DL (ref 32.2–35.5)
MCV RBC AUTO: 95 FL (ref 83–98)
PDW BLD AUTO: 9.8 FL (ref 9.4–12.3)
PLATELET # BLD AUTO: 209 K/UL (ref 150–369)
POTASSIUM SERPL-SCNC: 3.9 MEQ/L (ref 3.6–5.1)
PROT SERPL-MCNC: 5.7 G/DL (ref 6–8.2)
PROT UR-MCNC: <6 MG/DL
PROT/CREAT UR: NORMAL MG/G{CREAT}
RBC # BLD AUTO: 4.23 M/UL (ref 3.93–5.22)
SODIUM SERPL-SCNC: 137 MEQ/L (ref 136–144)
TSH SERPL DL<=0.05 MIU/L-ACNC: 2.29 MIU/L (ref 0.34–5.6)
URATE SERPL-MCNC: 4.1 MG/DL (ref 2.6–8)
WBC # BLD AUTO: 11.4 K/UL (ref 3.8–10.5)

## 2021-03-16 PROCEDURE — G0378 HOSPITAL OBSERVATION PER HR: HCPCS

## 2021-03-16 PROCEDURE — 84450 TRANSFERASE (AST) (SGOT): CPT | Performed by: ADVANCED PRACTICE MIDWIFE

## 2021-03-16 PROCEDURE — 85027 COMPLETE CBC AUTOMATED: CPT | Performed by: ADVANCED PRACTICE MIDWIFE

## 2021-03-16 PROCEDURE — 99203 OFFICE O/P NEW LOW 30 MIN: CPT | Performed by: HOSPITALIST

## 2021-03-16 PROCEDURE — 84075 ASSAY ALKALINE PHOSPHATASE: CPT | Performed by: ADVANCED PRACTICE MIDWIFE

## 2021-03-16 PROCEDURE — 84443 ASSAY THYROID STIM HORMONE: CPT | Performed by: ADVANCED PRACTICE MIDWIFE

## 2021-03-16 PROCEDURE — 82565 ASSAY OF CREATININE: CPT | Performed by: ADVANCED PRACTICE MIDWIFE

## 2021-03-16 PROCEDURE — 84460 ALANINE AMINO (ALT) (SGPT): CPT | Performed by: ADVANCED PRACTICE MIDWIFE

## 2021-03-16 PROCEDURE — 83615 LACTATE (LD) (LDH) ENZYME: CPT | Performed by: ADVANCED PRACTICE MIDWIFE

## 2021-03-16 PROCEDURE — 84550 ASSAY OF BLOOD/URIC ACID: CPT | Performed by: ADVANCED PRACTICE MIDWIFE

## 2021-03-16 PROCEDURE — 36415 COLL VENOUS BLD VENIPUNCTURE: CPT | Performed by: ADVANCED PRACTICE MIDWIFE

## 2021-03-16 PROCEDURE — 86901 BLOOD TYPING SEROLOGIC RH(D): CPT

## 2021-03-16 PROCEDURE — 93005 ELECTROCARDIOGRAM TRACING: CPT | Performed by: ADVANCED PRACTICE MIDWIFE

## 2021-03-16 PROCEDURE — 84156 ASSAY OF PROTEIN URINE: CPT | Performed by: ADVANCED PRACTICE MIDWIFE

## 2021-03-16 RX ORDER — LABETALOL HCL 20 MG/4 ML
20 SYRINGE (ML) INTRAVENOUS ONCE
Status: DISCONTINUED | OUTPATIENT
Start: 2021-03-16 | End: 2021-03-16 | Stop reason: HOSPADM

## 2021-03-16 SDOH — ECONOMIC STABILITY: TRANSPORTATION INSECURITY: IN THE PAST 12 MONTHS, HAS LACK OF TRANSPORTATION KEPT YOU FROM MEDICAL APPOINTMENTS OR FROM GETTING MEDICATIONS?: NO

## 2021-03-16 SDOH — ECONOMIC STABILITY: FOOD INSECURITY: WITHIN THE PAST 12 MONTHS, YOU WORRIED THAT YOUR FOOD WOULD RUN OUT BEFORE YOU GOT THE MONEY TO BUY MORE.: NEVER TRUE

## 2021-03-16 SDOH — ECONOMIC STABILITY: FOOD INSECURITY: HOW HARD IS IT FOR YOU TO PAY FOR THE VERY BASICS LIKE FOOD, HOUSING, MEDICAL CARE, AND HEATING?: NOT HARD AT ALL

## 2021-03-16 SDOH — ECONOMIC STABILITY: FOOD INSECURITY: WITHIN THE PAST 12 MONTHS, THE FOOD YOU BOUGHT JUST DIDN'T LAST AND YOU DIDN'T HAVE MONEY TO GET MORE.: NEVER TRUE

## 2021-03-16 ASSESSMENT — ACTIVITIES OF DAILY LIVING (ADL): LACK_OF_TRANSPORTATION: NO

## 2021-03-16 NOTE — DISCHARGE SUMMARY
Inpatient Discharge Summary    BRIEF OVERVIEW  Admitting Provider: Jessica Benjamin DO  Discharge Provider: Jessica Benjamin DO  Primary Care Physician at Discharge: Tanisha Plummer [phone]     Admission Date: 3/16/2021     Discharge Date: 3/16/2021    Primary Discharge Diagnosis  No Principal Problem: There is no principal problem currently on the Problem List. Please update the Problem List and refresh.    Secondary Discharge Diagnosis  PIH evaluation    Discharge Disposition  Home     Discharge Medications     Medication List      CONTINUE taking these medications    prenatal vit no.130-iron-folic 27 mg iron- 800 mcg tablet tablet  Take 1 tablet by mouth daily.  Dose: 1 tablet     sertraline 50 mg tablet  Commonly known as: ZOLOFT  Take 1 tablet (50 mg total) by mouth daily.  Dose: 50 mg     SYNTHROID 88 mcg tablet  Take 1 tablet (88 mcg total) by mouth daily. Brand necessary  Dose: 88 mcg  Generic drug: levothyroxine     valACYclovir 500 mg tablet  Commonly known as: VALTREX  Take 1 tablet (500 mg total) by mouth daily.  Dose: 500 mg            Active Issues Requiring Follow-up  Issue: maternal tachycardia and elevated BP  Responsible Individual: patient  What is Needed: f/u appt  Follow-up Appointments Arranged: Yes     Outpatient Follow-Up            In 1 week [facility]  [address] [place] [address]  Testing Center          Test Results Pending at Discharge      DETAILS OF HOSPITAL STAY    Presenting Problem/History of Present Illness  Elevated blood pressure affecting pregnancy in third trimester, antepartum [O16.3]      Hospital Course/Operative Procedures Performed    Consults: [facility]  Procedures: ECG 12 lead  Pertinent Test Results: labs: PIH  and CMP, TSH

## 2021-03-16 NOTE — NURSING NOTE
Pt received from [facility] for PIH workup. Pt complaining of heart palpitations. Denies SOB. Lungs clear & equal. Denies headache, blurry vision, RUQ pain. Reflexes WNL. Placed on tele per MD. EKG completed & labs sent. PIV placed. Plan to continue monitoring.

## 2021-03-16 NOTE — PROGRESS NOTES
"Labor and Delivery Progress Note    Subjective     Interval History: none.     Patient desires Natural Birth.    Objective     Vital Signs for the last 24 hours:  Temp:  [37.2 °C (98.9 °F)] 37.2 °C (98.9 °F)  Heart Rate:  [107-159] 121  Resp:  [18] 18  BP: (113-150)/(72-99) 123/78     Fetal Monitoring:  FHR Baseline: 120  FHR Variability: moderate  FHR Accelerations: present  FHR Decelerations: absent    Contraction Frequency: none    IUPC: no    Latest cervical exam: no cervical exam done                Vaginal Bleeding: not present (21 1542)      Current Facility-Administered Medications:   •  labetaloL (NORMODYNE,TRANDATE) injection 20 mg, 20 mg, intravenous, Once, Kenji Mcintyre CNM  •  prenatal vit no.130-iron-folic tablet 1 tablet, 1 tablet, oral, Daily, Kenji Mcintyre CNM    Assessment/Plan     Sherita Chavez is a 30 y.o. female  at 37w1d admitted with observation for PIH and maternal tachycardia      FHR: Category I  GBS:  negative     1. PIH labs WNL except for elevated AST and ALT. Platelets WNL. Other labs WNL as well. /78. Highest /99 but next BP readings were 142/89 and following readings were 120s/70s.   2. Pulse still elevated at baseline of 120. HMS came for a consult and no medications recommended.   3. Client will follow up with her cardiologist tomorrow morning for plan of adding medication  4. Client will come to [facility] office in 2 days for another BP and pulse check. Discussed the probability of IOL if BP is high at that time. Client understands.   5. Client denies any HA, RUQ pain, blurry vision, N/V at this time. \"I feel fine\". Knows to page MOC if any of these sx occur or if any heart palpitations, SOB, chest pain.  4. Dr Jacobo reviewed the labs and agrees with the plan to d/c client home.    Kenji Mcintyre CNM        "

## 2021-03-16 NOTE — ASSESSMENT & PLAN NOTE
"Currently in sinus tachy, 100-120s while I was in the room  There was difficulty reaching her peripartum cardiologist  Currently at baseline and feels comfortable going home  If no other reasons to require hospitalization from OB perspective, I think it is reasonable for discharge with f/u with her cardiologist  We can continue with overall strategy to \"watch and wait\" and add BB prn if she re-presents, but I would do so in consultation with cardiology  "

## 2021-03-16 NOTE — H&P
HPI     Sherita Chavez is a 30 y.o. female  at 37w1d with an estimated due date of 2021, by Patient Reported who presents with elevated BP and pulse at [facility].     Pregnancy complicated by cardiac hx of SVT. Has had cardiology consult. Also has Hashimotos, anemia,     Last PO intake:   ,     ,      OB History:   OB History    Para Term  AB Living   1 0 0 0 0 0   SAB TAB Ectopic Multiple Live Births   0 0 0 0 0      # Outcome Date GA Lbr Telly/2nd Weight Sex Delivery Anes PTL Lv   1 Current                Medical History:   Past Medical History:   Diagnosis Date   • Anemia    • Arrhythmia    • Hashimoto's disease    • High white blood cell cystine    • Hypothyroidism    • IBS (irritable bowel syndrome)    • Migraines    • Mitral valve prolapse    • OCD (obsessive compulsive disorder)        Surgical History:   Past Surgical History:   Procedure Laterality Date   • SKIN BIOPSY     • WISDOM TOOTH EXTRACTION         Social History:   Social History     Socioeconomic History   • Marital status:      Spouse name: Not on file   • Number of children: Not on file   • Years of education: Not on file   • Highest education level: Not on file   Occupational History   • Not on file   Social Needs   • Financial resource strain: Not on file   • Food insecurity     Worry: Not on file     Inability: Not on file   • Transportation needs     Medical: Not on file     Non-medical: Not on file   Tobacco Use   • Smoking status: Never Smoker   • Smokeless tobacco: Never Used   Substance and Sexual Activity   • Alcohol use: Never     Frequency: Never   • Drug use: Not on file   • Sexual activity: Not on file   Lifestyle   • Physical activity     Days per week: Not on file     Minutes per session: Not on file   • Stress: Not on file   Relationships   • Social connections     Talks on phone: Not on file     Gets together: Not on file     Attends Voodoo service: Not on file     Active member of club or  organization: Not on file     Attends meetings of clubs or organizations: Not on file     Relationship status: Not on file   • Intimate partner violence     Fear of current or ex partner: Not on file     Emotionally abused: Not on file     Physically abused: Not on file     Forced sexual activity: Not on file   Other Topics Concern   • Not on file   Social History Narrative    Do you wear your seatbelt? Yes    Do you have smoke detector in your home? Yes    Do you have a carbon monoxide detector in your home? 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         Family History:   Family History   Problem Relation Age of Onset   • Colon cancer Paternal Grandfather    • Arrhythmia Paternal Grandfather    • No Known Problems Biological Mother    • Arrhythmia Biological Father    • No Known Problems Biological Brother    • Heart disease Maternal Grandmother    • Heart failure Maternal Grandmother    • Hyperlipidemia Maternal Grandmother    • Breast cancer Neg Hx        Allergies: Amoxicillin, Moxifloxacin, Sulfa (sulfonamide antibiotics), Ciprofloxacin, Ciprofloxacin hcl, Moxifloxacin hcl, and Sulfamethoxazole-trimethoprim    Prior to Admission medications    Medication Sig Start Date End Date Taking? Authorizing Provider   levothyroxine (SYNTHROID) 88 mcg tablet Take 1 tablet (88 mcg total) by mouth daily. 11/21/20   Rowena Cabrera DNP   sertraline (ZOLOFT) 50 mg tablet Take 1 tablet (50 mg total) by mouth daily.  Patient not taking: Reported on 10/5/2020  2/12/20   Tanisha Plummer MD   SYNTHROID 88 mcg tablet Take 1 tablet (88 mcg total) by mouth daily. Brand necessary 11/16/20   Tanisha Plummer MD   valACYclovir (VALTREX) 1 gram tablet Take 2 g by mouth as needed.    Provider, MD Chinyere   valACYclovir (VALTREX) 500 mg tablet Take 1 tablet (500 mg total) by mouth daily.  Patient not taking: Reported on 2/26/2020 2/12/20   Tanisha Plummer MD       Review of Systems  Pertinent items  are noted in HPI.    Objective     Vital Signs for the last 24 hours:  Temp:  [37.2 °C (98.9 °F)] 37.2 °C (98.9 °F)  Heart Rate:  [148] 148  Resp:  [18] 18  BP: (127)/(92) 127/92    Latest cervical exam:                Additional Tests:   Sterile Speculum Exam: no  Pooling: no  Nitrazine: no  Ferning: no  Bleeding: none  Vaginal Discharge: none  Cervical Abnormailites: none  Other: none    Fetal Monitoring:  FHR Baseline: 120  FHR Variability: moderate  FHR Accelerations: present  FHR Decelerations: absent    Contraction Frequency: none    Exam:  General Appearance: Alert, cooperative, no acute distress  Lungs: Clear to auscultation bilaterally, respirations unlabored  Heart: Regular rate and rhythm, S1 and S2 normal, no murmur, rub or gallop  Abdomen: gravid, nontender  Genitalia: See vaginal exam  Extremities: no edema or calf tenderness  Neurologic: grossly intact without focal deficits      Labs:  No results found for: ABO, LABRH, RUBELLAIGGQT, GBS    Assessment/Plan     Sherita Chavez is a 30 y.o. female  at 37w1d admitted for observation for PIH and maternal tachycaria    FHR: Category I  GBS: negative     1. Sherita is here for PIH check. Labs ordered.  2. Consulted with Dr Wei regarding maternal tachycardia and advised to order: CMP, CBC, TSH, EKG and HMS or Cardiology consult. Orders placed.  3. Dr Garcias reviewed the chart and ok with observation plan.     Kenji Mcintyre CNM

## 2021-03-16 NOTE — CONSULTS
"   Hospital Medicine Service -  Inpatient Consultation         Requesting Physician: Dr. Garcias    Reason for Consultation: paliptations     HISTORY OF PRESENT ILLNESS        This is a 30 y.o. female with hx of Hashimoto thyroiditis, hypothyroid with stable TSH and synthroid dose throughout pregnancy.  She has hx of MVP and pSVT and sees a peripartum cardiologist, Dr. Perdue who has seen her for the pSVT.    Her sx usually include palpitation with HR in 120s.  She has been to 150s in the past, but apparently this was for a short duration.  The episodes in the 120s last up to 30 minutes and resolve spontaneously without medication or vagal maneuvers.  The plan, per the outside records, appears to be a \"watch and wait\" strategy, with possible addition of beta blockade, if required.    Pt today experienced palpitations, which prompted outpt EKG showing HR in 150s.  An attempt was made to reach Dr. Perdue unsuccessfully, so she was brought to the hospital for further instruction.  At the current time, she feels \"normal.\" She has been on telemetry since arrival, and HR has been 100-120s.    No CP, SOB, nausea.  She does endorse anxiety.  No RUQ pain.  No vision issues    PAST MEDICAL AND SURGICAL HISTORY        Past Medical History:   Diagnosis Date   • Anemia    • Arrhythmia    • Hashimoto's disease    • High white blood cell cystine    • Hypertension    • Hypothyroidism    • IBS (irritable bowel syndrome)    • Migraines    • Mitral valve prolapse    • OCD (obsessive compulsive disorder)    • SVT (supraventricular tachycardia) (CMS/HCC)        Past Surgical History:   Procedure Laterality Date   • SKIN BIOPSY     • WISDOM TOOTH EXTRACTION         PCP: Tanisha Plummer MD    MEDICATIONS        Home Medications:  Medications Prior to Admission   Medication Sig Dispense Refill Last Dose   • prenatal vit no.130-iron-folic 27 mg iron- 800 mcg tablet tablet Take 1 tablet by mouth daily.   3/15/2021 at Unknown time "   • SYNTHROID 88 mcg tablet Take 1 tablet (88 mcg total) by mouth daily. Brand necessary 90 tablet 1 3/16/2021 at Unknown time   • sertraline (ZOLOFT) 50 mg tablet Take 1 tablet (50 mg total) by mouth daily. (Patient not taking: Reported on 10/5/2020 ) 90 tablet 0 More than a month at Unknown time   • valACYclovir (VALTREX) 500 mg tablet Take 1 tablet (500 mg total) by mouth daily. (Patient not taking: Reported on 2/26/2020 ) 90 tablet 1 More than a month at Unknown time       Current inpatient medications were personally reviewed.    ALLERGIES        Amoxicillin, Ciprofloxacin, Moxifloxacin, Sulfa (sulfonamide antibiotics), and Moxifloxacin hcl    FAMILY HISTORY        Family History   Problem Relation Age of Onset   • Colon cancer Paternal Grandfather    • Arrhythmia Paternal Grandfather    • No Known Problems Biological Mother    • Arrhythmia Biological Father    • No Known Problems Biological Brother    • Heart disease Maternal Grandmother    • Heart failure Maternal Grandmother    • Hyperlipidemia Maternal Grandmother    • Breast cancer Neg Hx        SOCIAL HISTORY        Social History     Socioeconomic History   • Marital status:      Spouse name: Dylon   • Number of children: None   • Years of education: None   • Highest education level: None   Occupational History   • 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   Social Needs   • Financial resource strain: Not hard at all   • Food insecurity     Worry: Never true     Inability: Never true   • Transportation needs     Medical: No     Non-medical: No   Tobacco Use   • Smoking status: Never Smoker   • Smokeless tobacco: Never Used   Substance and Sexual Activity   • Alcohol use: Never     Frequency: Never   • Drug use: Never   • Sexual activity: Yes     Partners: Male   Lifestyle   • Physical activity     Days per week: None     Minutes per session: None   • Stress: None   Relationships   • Social connections     Talks on phone: None     Gets together: None     " Attends Quaker service: None     Active member of club or organization: None     Attends meetings of clubs or organizations: None     Relationship status: None   • Intimate partner violence     Fear of current or ex partner: None     Emotionally abused: None     Physically abused: None     Forced sexual activity: None   Other Topics Concern   • None   Social History Narrative    Do you wear your seatbelt? Yes    Do you have smoke detector in your home? Yes    Do you have a carbon monoxide detector in your home? 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        REVIEW OF SYSTEMS        All other systems reviewed and negative except as noted in HPI    PHYSICAL EXAMINATION        Visit Vitals  /81   Pulse (!) 117   Temp 37.2 °C (98.9 °F) (Oral)   Resp 18   Ht 1.626 m (5' 4\")   Wt 83.5 kg (184 lb)   SpO2 97%   Breastfeeding Yes Comment: plan to breastfeed   BMI 31.58 kg/m²     Body mass index is 31.58 kg/m².  No intake or output data in the 24 hours ending 03/16/21 1801    Physical Exam   Nontoxic and comfortable  Regular, tachy in 110s, no gallop or rub  CTAB anteriorly  Uterus is gravid  Trace LE edema  anicteric    LABS / EKG        Labs  Results from last 7 days   Lab Units 03/16/21  1611   WBC K/uL 11.40*   HEMOGLOBIN g/dL 13.3   HEMATOCRIT % 40.2   PLATELETS K/uL 209     Results from last 7 days   Lab Units 03/16/21  1612   SODIUM mEQ/L 137   POTASSIUM mEQ/L 3.9   CHLORIDE mEQ/L 107   CO2 mEQ/L 21*   BUN mg/dL <5*   CREATININE mg/dL 0.6  0.4*   CALCIUM mg/dL 9.8   ALBUMIN g/dL 3.0*   BILIRUBIN TOTAL mg/dL 0.5   ALK PHOS IU/L 173*   ALT IU/L 56*  56*   AST IU/L 43*  42*   GLUCOSE mg/dL 77         No results found for: COVID19     Tele pers revd: sinus tachy 100-120s  EKG pers revd: prior to admissoin EKG was SVT in 140-150s  EKG here sinus tachy in 120s    ASSESSMENT AND RECOMMENDATIONS           Sinus tachycardia  Assessment & Plan  Currently in sinus tachy, 100-120s while I " "was in the room  There was difficulty reaching her peripartum cardiologist  Currently at baseline and feels comfortable going home  If no other reasons to require hospitalization from OB perspective, I think it is reasonable for discharge with f/u with her cardiologist  We can continue with overall strategy to \"watch and wait\" and add BB prn if she re-presents, but I would do so in consultation with cardiology    Palpitations  Assessment & Plan  Stable at her baseline    Iron deficiency anemia  Assessment & Plan  Receive outpt Fe    Hashimoto's disease  Assessment & Plan  States she has been on same dose of synthroid throughout pregnancy              Juana Caputo,   3/16/2021           "

## 2021-03-16 NOTE — NURSING NOTE
Per Dr. Ceja, Kenji WHITE, & [facility] dr. Barr OK to d/c. Tele monitors removed & plan reviewed with patient to return to [facility] for followup appt. Pt denies current palpitation or chest pain.  Denies sob, headache, blurred vision, or dizziness.  Discharge instructions provided to pt and  who verbalize understanding.

## 2021-03-17 LAB
ATRIAL RATE: 122
P AXIS: 60
PR INTERVAL: 126
QRS DURATION: 68
QT INTERVAL: 310
QTC CALCULATION(BAZETT): 441
R AXIS: 59
T WAVE AXIS: 16
VENTRICULAR RATE: 122

## 2021-03-18 ENCOUNTER — HOSPITAL ENCOUNTER (INPATIENT)
Facility: HOSPITAL | Age: 31
LOS: 4 days | Discharge: HOME | End: 2021-03-23
Attending: OBSTETRICS & GYNECOLOGY | Admitting: STUDENT IN AN ORGANIZED HEALTH CARE EDUCATION/TRAINING PROGRAM
Payer: COMMERCIAL

## 2021-03-18 PROBLEM — O13.3 GESTATIONAL HYPERTENSION, THIRD TRIMESTER: Status: ACTIVE | Noted: 2021-03-18

## 2021-03-18 LAB
ABO + RH BLD: NORMAL
ALT SERPL-CCNC: 45 IU/L (ref 11–54)
AST SERPL-CCNC: 34 IU/L (ref 15–41)
BLD GP AB SCN SERPL QL: NEGATIVE
CREAT SERPL-MCNC: 0.5 MG/DL (ref 0.6–1.1)
CREAT UR-MCNC: 27.5 MG/DL
D AG BLD QL: POSITIVE
ERYTHROCYTE [DISTWIDTH] IN BLOOD BY AUTOMATED COUNT: 15.9 % (ref 11.7–14.4)
GFR SERPL CREATININE-BSD FRML MDRD: >60 ML/MIN/1.73M*2
HCT VFR BLDCO AUTO: 39 % (ref 35–45)
HGB BLD-MCNC: 12.9 G/DL (ref 11.8–15.7)
LABORATORY COMMENT REPORT: NORMAL
MCH RBC QN AUTO: 31.5 PG (ref 28–33.2)
MCHC RBC AUTO-ENTMCNC: 33.1 G/DL (ref 32.2–35.5)
MCV RBC AUTO: 95.4 FL (ref 83–98)
PDW BLD AUTO: 9.9 FL (ref 9.4–12.3)
PLATELET # BLD AUTO: 198 K/UL (ref 150–369)
PROT UR-MCNC: 6 MG/DL
PROT/CREAT UR: 0.22 MG/G CREAT
RBC # BLD AUTO: 4.09 M/UL (ref 3.93–5.22)
SARS-COV-2 RNA RESP QL NAA+PROBE: NEGATIVE
WBC # BLD AUTO: 11.99 K/UL (ref 3.8–10.5)

## 2021-03-18 PROCEDURE — U0003 INFECTIOUS AGENT DETECTION BY NUCLEIC ACID (DNA OR RNA); SEVERE ACUTE RESPIRATORY SYNDROME CORONAVIRUS 2 (SARS-COV-2) (CORONAVIRUS DISEASE [COVID-19]), AMPLIFIED PROBE TECHNIQUE, MAKING USE OF HIGH THROUGHPUT TECHNOLOGIES AS DESCRIBED BY CMS-2020-01-R: HCPCS | Performed by: ADVANCED PRACTICE MIDWIFE

## 2021-03-18 PROCEDURE — 84460 ALANINE AMINO (ALT) (SGPT): CPT | Performed by: ADVANCED PRACTICE MIDWIFE

## 2021-03-18 PROCEDURE — 86780 TREPONEMA PALLIDUM: CPT | Performed by: ADVANCED PRACTICE MIDWIFE

## 2021-03-18 PROCEDURE — G0378 HOSPITAL OBSERVATION PER HR: HCPCS

## 2021-03-18 PROCEDURE — 36415 COLL VENOUS BLD VENIPUNCTURE: CPT | Performed by: ADVANCED PRACTICE MIDWIFE

## 2021-03-18 PROCEDURE — 82570 ASSAY OF URINE CREATININE: CPT | Performed by: ADVANCED PRACTICE MIDWIFE

## 2021-03-18 PROCEDURE — 85027 COMPLETE CBC AUTOMATED: CPT | Performed by: ADVANCED PRACTICE MIDWIFE

## 2021-03-18 PROCEDURE — 82565 ASSAY OF CREATININE: CPT | Performed by: ADVANCED PRACTICE MIDWIFE

## 2021-03-18 PROCEDURE — 63700000 HC SELF-ADMINISTRABLE DRUG: Performed by: ADVANCED PRACTICE MIDWIFE

## 2021-03-18 PROCEDURE — 86850 RBC ANTIBODY SCREEN: CPT

## 2021-03-18 PROCEDURE — 86901 BLOOD TYPING SEROLOGIC RH(D): CPT

## 2021-03-18 PROCEDURE — 84450 TRANSFERASE (AST) (SGOT): CPT | Performed by: ADVANCED PRACTICE MIDWIFE

## 2021-03-18 RX ORDER — OXYTOCIN/0.9 % SODIUM CHLORIDE 20/1000 ML
500 PLASTIC BAG, INJECTION (ML) INTRAVENOUS ONCE
Status: ACTIVE | OUTPATIENT
Start: 2021-03-18 | End: 2021-03-19

## 2021-03-18 RX ORDER — METHYLERGONOVINE MALEATE 0.2 MG/ML
200 INJECTION INTRAVENOUS ONCE AS NEEDED
Status: CANCELLED | OUTPATIENT
Start: 2021-03-18

## 2021-03-18 RX ORDER — TRANEXAMIC ACID 10 MG/ML
1000 INJECTION, SOLUTION INTRAVENOUS ONCE AS NEEDED
Status: CANCELLED | OUTPATIENT
Start: 2021-03-18

## 2021-03-18 RX ORDER — DIPHENHYDRAMINE HCL 25 MG
25 CAPSULE ORAL NIGHTLY PRN
Status: DISCONTINUED | OUTPATIENT
Start: 2021-03-18 | End: 2021-03-19

## 2021-03-18 RX ORDER — LEVOTHYROXINE SODIUM 88 UG/1
88 TABLET ORAL
Status: DISCONTINUED | OUTPATIENT
Start: 2021-03-19 | End: 2021-03-23 | Stop reason: HOSPADM

## 2021-03-18 RX ORDER — CARBOPROST TROMETHAMINE 250 UG/ML
250 INJECTION, SOLUTION INTRAMUSCULAR ONCE AS NEEDED
Status: CANCELLED | OUTPATIENT
Start: 2021-03-18

## 2021-03-18 RX ORDER — MISOPROSTOL 200 UG/1
1000 TABLET ORAL ONCE AS NEEDED
Status: CANCELLED | OUTPATIENT
Start: 2021-03-18

## 2021-03-18 RX ORDER — OXYTOCIN/0.9 % SODIUM CHLORIDE 20/1000 ML
PLASTIC BAG, INJECTION (ML) INTRAVENOUS CONTINUOUS
Status: ACTIVE | OUTPATIENT
Start: 2021-03-18 | End: 2021-03-18

## 2021-03-18 RX ORDER — OXYTOCIN 10 [USP'U]/ML
10 INJECTION, SOLUTION INTRAMUSCULAR; INTRAVENOUS ONCE AS NEEDED
Status: CANCELLED | OUTPATIENT
Start: 2021-03-18

## 2021-03-18 RX ORDER — LIDOCAINE HYDROCHLORIDE 10 MG/ML
0-30 INJECTION, SOLUTION EPIDURAL; INFILTRATION; INTRACAUDAL; PERINEURAL ONCE AS NEEDED
Status: CANCELLED | OUTPATIENT
Start: 2021-03-18

## 2021-03-18 RX ADMIN — DIPHENHYDRAMINE HYDROCHLORIDE 25 MG: 25 CAPSULE ORAL at 21:56

## 2021-03-18 NOTE — PROGRESS NOTES
Reviewed plan of care re elevated Bps in office and client meeting criteria for GHTN. Dicussed client was already on schedule for tonight. Agreeable with plan of care for IOL for GHTN.    Sasha Jordan CNM

## 2021-03-18 NOTE — H&P
HPI     Sherita Chavez is a 30 y.o. female  at 37w3d with an estimated due date of 2021, by Patient Reported who presents for IOL for GHTN d/t elevated BP in office. Denies HA, RUQ pain, and visual changes. Denies swelling, Cxts, and LOF.    OB History:   OB History    Para Term  AB Living   1 0 0 0 0 0   SAB TAB Ectopic Multiple Live Births   0 0 0 0 0      # Outcome Date GA Lbr Telly/2nd Weight Sex Delivery Anes PTL Lv   1 Current              Medical History:   Past Medical History:   Diagnosis Date   • Anemia    • Arrhythmia    • Hashimoto's disease    • High white blood cell cystine    • Hypertension    • Hypothyroidism    • IBS (irritable bowel syndrome)    • Migraines    • Mitral valve prolapse    • OCD (obsessive compulsive disorder)    • SVT (supraventricular tachycardia) (CMS/HCC)        Surgical History:   Past Surgical History:   Procedure Laterality Date   • SKIN BIOPSY     • WISDOM TOOTH EXTRACTION       Social History:   Social History     Socioeconomic History   • Marital status:      Spouse name: Dylon   • Number of children: Not on file   • Years of education: Not on file   • Highest education level: Not on file   Occupational History   • 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   Social Needs   • Financial resource strain: Not hard at all   • Food insecurity     Worry: Never true     Inability: Never true   • Transportation needs     Medical: No     Non-medical: No   Tobacco Use   • Smoking status: Never Smoker   • Smokeless tobacco: Never Used   Substance and Sexual Activity   • Alcohol use: Never     Frequency: Never   • Drug use: Never   • Sexual activity: Yes     Partners: Male   Lifestyle   • Physical activity     Days per week: Not on file     Minutes per session: Not on file   • Stress: Not on file   Relationships   • Social connections     Talks on phone: Not on file     Gets together: Not on file     Attends Orthodox service: Not on file     Active member of  club or organization: Not on file     Attends meetings of clubs or organizations: Not on file     Relationship status: Not on file   • Intimate partner violence     Fear of current or ex partner: Not on file     Emotionally abused: Not on file     Physically abused: Not on file     Forced sexual activity: Not on file   Other Topics Concern   • Not on file   Social History Narrative    Do you wear your seatbelt? Yes    Do you have smoke detector in your home? Yes    Do you have a carbon monoxide detector in your home? 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       Family History:   Family History   Problem Relation Age of Onset   • Colon cancer Paternal Grandfather    • Arrhythmia Paternal Grandfather    • No Known Problems Biological Mother    • Arrhythmia Biological Father    • No Known Problems Biological Brother    • Heart disease Maternal Grandmother    • Heart failure Maternal Grandmother    • Hyperlipidemia Maternal Grandmother    • Breast cancer Neg Hx      Allergies: Ciprofloxacin, Moxifloxacin, Sulfa (sulfonamide antibiotics), and Moxifloxacin hcl    Prior to Admission medications    Medication Sig Start Date End Date Taking? Authorizing Provider   prenatal vit no.130-iron-folic 27 mg iron- 800 mcg tablet tablet Take 1 tablet by mouth daily.    Provider, MD Chinyere   sertraline (ZOLOFT) 50 mg tablet Take 1 tablet (50 mg total) by mouth daily.  Patient not taking: Reported on 10/5/2020  2/12/20   Tanisha Plummer MD   SYNTHROID 88 mcg tablet Take 1 tablet (88 mcg total) by mouth daily. Brand necessary 11/16/20   Tanisha Plummer MD   valACYclovir (VALTREX) 500 mg tablet Take 1 tablet (500 mg total) by mouth daily.  Patient not taking: Reported on 2/26/2020 2/12/20   Tanisha Plummer MD     Review of Systems  Pertinent items are noted in HPI.    Objective   Vital Signs for the last 24 hours:  Heart Rate:  [127] 127  BP: (133)/(79) 133/79    Latest cervical exam:  Cervical  Dilation (cm): 0-1  Cervical Effacement: 10  Fetal Station: -3  Method: sterile exam per CNBAKARI (21 2415)    Fetal Monitoring:  FHR Baseline: 150  FHR Variability: moderate  FHR Accelerations: present  FHR Decelerations: absent  Contraction Frequency: q 2-5 min lasting 45-60 second, mild by palpation    Exam:  General Appearance: Alert, cooperative, no acute distress  Lungs: respirations unlabored  Heart: Regular rate and rhythm  Abdomen: gravid, nontender  Genitalia: See vaginal exam  Extremities: trace edema or calf tenderness  Neurologic: grossly intact without focal deficits    Ultrasounds:   I have reviewed the applicable Ultrasounds.    Assessment/Plan   Sherita Chavez is a 30 y.o. female  at 37w3d admitted for induction of labor for GHNT.    1) FHR: Category I.  CEFM per [facility] protocol.  2) GBS: negative  3) Admit. Repeat BP labs. IOL for GHNT. Will likely start with cohen bulb/cok cath if possible.  4) Dr. Guerin notified.  Aware and agreeable with plan of care.    Sasha Jordan CNM

## 2021-03-18 NOTE — PROGRESS NOTES
Labor and Delivery Progress Note    Subjective   Unaware of cxts. Reports seeing BS in toilet after VE.    Objective   Vital Signs for the last 24 hours:  Heart Rate:  [113-127] 113  BP: (112-133)/(67-79) 124/77     Fetal Monitoring:  FHR Baseline: 140  FHR Variability: moderate  FHR Accelerations: present  FHR Decelerations: absent  Contraction Frequency: q 2-3 min lasting 40-60 seconds, mild by palpation  IUPC: no    Latest cervical exam:  Cervical Dilation (cm): 0-1  Cervical Effacement: 10  Fetal Station: -3  Method: sterile exam per CNM (21)  Vaginal Bleeding: bloody show (21)    Assessment/Plan   Sherita Chavez is a 30 y.o. female  at 37w3d admitted for induction of labor [facility].    1) FHR: Category I. CEFM per [facility] protocol.  2) GBS:  negative  3) Cook catheter inserted. BS noted with insertion. 40 ml placed in vaginal and uterine balloon. Remove per protocol in 12 hrs.  4) Re-evaluate when cook cath falls out or after if is removed.  5) If client is comfortable in the AM, okay to shower and have light breakfast before next steps are started.      Sasha Jordan CNM   (Late Entry)

## 2021-03-19 PROBLEM — O13.9 GESTATIONAL HYPERTENSION AFFECTING FIRST PREGNANCY: Status: ACTIVE | Noted: 2021-03-19

## 2021-03-19 LAB — T PALLIDUM AB SER QL IF: NONREACTIVE

## 2021-03-19 PROCEDURE — 63700000 HC SELF-ADMINISTRABLE DRUG: Performed by: ADVANCED PRACTICE MIDWIFE

## 2021-03-19 PROCEDURE — 25000000 HC PHARMACY GENERAL: Performed by: ADVANCED PRACTICE MIDWIFE

## 2021-03-19 PROCEDURE — 63600000 HC DRUGS/DETAIL CODE: Performed by: ADVANCED PRACTICE MIDWIFE

## 2021-03-19 PROCEDURE — 12000000 HC ROOM AND CARE MED/SURG

## 2021-03-19 PROCEDURE — G0378 HOSPITAL OBSERVATION PER HR: HCPCS

## 2021-03-19 PROCEDURE — 3E033VJ INTRODUCTION OF OTHER HORMONE INTO PERIPHERAL VEIN, PERCUTANEOUS APPROACH: ICD-10-PCS | Performed by: ADVANCED PRACTICE MIDWIFE

## 2021-03-19 RX ORDER — SODIUM CHLORIDE, SODIUM LACTATE, POTASSIUM CHLORIDE, CALCIUM CHLORIDE 600; 310; 30; 20 MG/100ML; MG/100ML; MG/100ML; MG/100ML
125 INJECTION, SOLUTION INTRAVENOUS CONTINUOUS
Status: ACTIVE | OUTPATIENT
Start: 2021-03-19 | End: 2021-03-19

## 2021-03-19 RX ORDER — SODIUM CHLORIDE, SODIUM LACTATE, POTASSIUM CHLORIDE, CALCIUM CHLORIDE 600; 310; 30; 20 MG/100ML; MG/100ML; MG/100ML; MG/100ML
INJECTION, SOLUTION INTRAVENOUS CONTINUOUS
Status: DISCONTINUED | OUTPATIENT
Start: 2021-03-19 | End: 2021-03-23 | Stop reason: HOSPADM

## 2021-03-19 RX ORDER — OXYTOCIN/0.9 % SODIUM CHLORIDE 30/500 ML
0-20 PLASTIC BAG, INJECTION (ML) INTRAVENOUS CONTINUOUS
Status: DISCONTINUED | OUTPATIENT
Start: 2021-03-19 | End: 2021-03-23 | Stop reason: HOSPADM

## 2021-03-19 RX ORDER — MISOPROSTOL 100 UG/1
25 TABLET ORAL ONCE
Status: COMPLETED | OUTPATIENT
Start: 2021-03-19 | End: 2021-03-19

## 2021-03-19 RX ORDER — MISOPROSTOL 100 UG/1
50 TABLET ORAL ONCE
Status: DISCONTINUED | OUTPATIENT
Start: 2021-03-19 | End: 2021-03-19

## 2021-03-19 RX ORDER — DIPHENHYDRAMINE HCL 50 MG/ML
25 VIAL (ML) INJECTION ONCE
Status: COMPLETED | OUTPATIENT
Start: 2021-03-19 | End: 2021-03-19

## 2021-03-19 RX ADMIN — SODIUM CHLORIDE, POTASSIUM CHLORIDE, SODIUM LACTATE AND CALCIUM CHLORIDE 125 ML/HR: 600; 310; 30; 20 INJECTION, SOLUTION INTRAVENOUS at 15:23

## 2021-03-19 RX ADMIN — DIPHENHYDRAMINE HYDROCHLORIDE 25 MG: 50 INJECTION INTRAMUSCULAR; INTRAVENOUS at 22:49

## 2021-03-19 RX ADMIN — MISOPROSTOL 25 MCG: 100 TABLET ORAL at 10:01

## 2021-03-19 RX ADMIN — OXYTOCIN-SODIUM CHLORIDE 0.9% IV SOLN 30 UNIT/500ML 2 MILLI-UNITS/MIN: 30-0.9/5 SOLUTION at 21:35

## 2021-03-19 RX ADMIN — SODIUM CHLORIDE, POTASSIUM CHLORIDE, SODIUM LACTATE AND CALCIUM CHLORIDE: 600; 310; 30; 20 INJECTION, SOLUTION INTRAVENOUS at 14:06

## 2021-03-19 RX ADMIN — OXYTOCIN-SODIUM CHLORIDE 0.9% IV SOLN 30 UNIT/500ML 2 MILLI-UNITS/MIN: 30-0.9/5 SOLUTION at 15:26

## 2021-03-19 RX ADMIN — LEVOTHYROXINE SODIUM 88 MCG: 88 TABLET ORAL at 08:14

## 2021-03-19 RX ADMIN — SODIUM CHLORIDE, POTASSIUM CHLORIDE, SODIUM LACTATE AND CALCIUM CHLORIDE 125 ML/HR: 600; 310; 30; 20 INJECTION, SOLUTION INTRAVENOUS at 22:49

## 2021-03-19 NOTE — PROGRESS NOTES
"Labor and Delivery Progress Note    Subjective     Interval History: none. Sherita is feeling comfortable, states she does not feel contractions at all. \"They are less than a 1\". Currenlty lying on her side with the peanut ball.    Patient desires Natural Birth.    Objective     Vital Signs for the last 24 hours:  Temp:  [36.6 °C (97.9 °F)-37.2 °C (98.9 °F)] 36.6 °C (97.9 °F)  Heart Rate:  [] 133  Resp:  [18] 18  BP: (106-140)/(63-89) 128/72     Fetal Monitoring:  FHR Baseline: 135  FHR Variability: moderate  FHR Accelerations: present  FHR Decelerations: absent     Contraction Frequency: q 2-5 min    IUPC: no    Latest cervical exam:  Cervical Dilation (cm): 3  Cervical Effacement: 50  Fetal Station: -3  Method: sterile exam per CNM (21)    Vaginal Bleeding: not present (21)      Current Facility-Administered Medications:   •  diphenhydrAMINE (BENADRYL) capsule 25 mg, 25 mg, oral, Nightly PRN, Mayte Carrillo CNM, 25 mg at 21 2156  •  lactated ringer's infusion, , intravenous, Continuous, Carmen Dozier CNM, Last Rate: 125 mL/hr at 21 1406, New Bag at 21 1406  •  lactated ringer's infusion, 125 mL/hr, intravenous, Continuous, Last Rate: 125 mL/hr at 21 1523, 125 mL/hr at 21 1523 **AND** oxytocin in 0.9 % NSS (PITOCIN) 30 unit/500 mL infusion, 0-20 joce-units/min, intravenous, Continuous, Carmen Dozier CNM, Last Rate: 4 mL/hr at 21 1650, 4 joce-units/min at 21 1650  •  levothyroxine (SYNTHROID) tablet 88 mcg, 88 mcg, oral, Daily (6:30a), Sasha Jordan CNM, 88 mcg at 21 0814  •  prenatal vit no.130-iron-folic tablet 1 tablet, 1 tablet, oral, Daily, Sasha Jordan CNM    Assessment/Plan     Sherita Chavez is a 30 y.o. female  at 37w4d admitted with induction of labor secondary to gestational hypertension.     FHR: Category I  GBS: negative     BPs have all been normal over during my shift. Client does " not appreciate my contractions. Will discuss plan of care with Dr. Jacobo when she is available.     Carmen Dozier CNM  3/19/2021

## 2021-03-19 NOTE — PROGRESS NOTES
Labor and Delivery Progress Note    Subjective     Patient is comfortable. Feels she rested well overnight. Contractions are noticeable ot her Q 2-3 minutes overnight, but still feel mild.     Objective     Vital Signs for the last 24 hours:  Temp:  [36.8 °C (98.2 °F)-37.2 °C (98.9 °F)] 36.8 °C (98.2 °F)  Heart Rate:  [] 99  Resp:  [18] 18  BP: (106-140)/(63-89) 113/65     Fetal Monitoring:  FHR Baseline: 140  FHR Variability: moderate  FHR Accelerations: present  FHR Decelerations: absent    Contraction Frequency: Q2-5min    IUPC: no    Latest cervical exam:  Cervical Dilation (cm): 3  Cervical Effacement: 50  Fetal Station: -3  Method: sterile exam per ALVINO (21 0623)    Vaginal Bleeding: bloody show (21 1619)      Current Facility-Administered Medications:   •  diphenhydrAMINE (BENADRYL) capsule 25 mg, 25 mg, oral, Nightly PRN, Mayte Carrillo CNM, 25 mg at 21 2156  •  levothyroxine (SYNTHROID) tablet 88 mcg, 88 mcg, oral, Daily (6:30a), Sasha Jordan CNM  •  prenatal vit no.130-iron-folic tablet 1 tablet, 1 tablet, oral, Daily, Sasha Jordan CNM    Assessment/Plan     Sherita Chavez is a 30 y.o. female  at 37w4d admitted with induction of labor for GHTN. Asymptomatic. BPs have been stable and wnl overnight.     FHR: Category I  GBS:  negative   Cook cath removed  Plan to shower and have breakfast, consider misoprostol dose for continued cervical ripening, depending on contraction pattern after shower/breakfast vs starting Pitocin, Sherita is agreeable to this.  Dr Potter updated on client status and plan.    Mayte Carrillo CNM

## 2021-03-19 NOTE — PROGRESS NOTES
"Labor and Delivery Progress Note    Sherita is tearful \"I feel so tired with my fast heart rate\" Good FM. First dose of cytotec just given buccally.     Subjective     Interval History: none.     Patient desires Natural Birth.    Objective     Vital Signs for the last 24 hours:  Temp:  [36.8 °C (98.2 °F)-37.2 °C (98.9 °F)] 36.8 °C (98.2 °F)  Heart Rate:  [] 99  Resp:  [18] 18  BP: (106-140)/(63-89) 113/65     Most recent maternal HR is 120s.    Fetal Monitoring:  FHR Baseline: 150  FHR Variability: moderate  FHR Accelerations: present  FHR Decelerations: absent     Contraction Frequency: q 3-12 minutes    IUPC: no    Latest cervical exam:  Cervical Dilation (cm): 3  Cervical Effacement: 50  Fetal Station: -3  Method: sterile exam per CNM (21 0623)    Vaginal Bleeding: bloody show (21 1619)      Current Facility-Administered Medications:   •  diphenhydrAMINE (BENADRYL) capsule 25 mg, 25 mg, oral, Nightly PRN, Mayte Carrillo CNM, 25 mg at 21 2156  •  levothyroxine (SYNTHROID) tablet 88 mcg, 88 mcg, oral, Daily (6:30a), Sasha Jordan CNM, 88 mcg at 21 0814  •  prenatal vit no.130-iron-folic tablet 1 tablet, 1 tablet, oral, Daily, Sasha Jordan CNM    Assessment/Plan     Sherita Chavez is a 30 y.o. female  at 37w4d admitted with induction of labor secondary to gestational hypertension.     FHR: Category I  GBS: negative     1) Consulted with Dr. Jacobo about consulting cardiology and she agrees. Consult place via Epic and covering ohysician and NP paged.  2) 25 mcg cytotec just given  3) Encouraged rest and hydration  4) continue maternal telemetry monitoring    Carmen Dozier CNM  3/19/2021        "

## 2021-03-19 NOTE — PROGRESS NOTES
"Labor and Delivery Progress Note    Subjective Sherita is sitting on the birth ball. She states she is feeling about 1 contraction per hour \"that rates about a 1\". Good FM. She is afraid that her digestion of food this morning caused her tachycardia and has been only sipping her clear liquids every hour.    Client was seen by cardiologist fellow who indicated she could be off cardiac monitor if asymptomatic and HR <150.  At consistent HR >170, should consider adenosine.    Interval History: none.     Patient desires Natural Birth.    Objective     Vital Signs for the last 24 hours:  Temp:  [36.6 °C (97.9 °F)-37.2 °C (98.9 °F)] 36.6 °C (97.9 °F)  Heart Rate:  [] 108  Resp:  [18] 18  BP: (106-140)/(63-89) 111/70     Fetal Monitoring:  FHR Baseline: 150  FHR Variability: moderate  FHR Accelerations: present  FHR Decelerations: absent     Contraction Frequency: q 3 minutes, unappreciated by client.    IUPC: no    Latest cervical exam:  Cervical Dilation (cm): 3  Cervical Effacement: 50  Fetal Station: -3  Method: sterile exam per ALVINO (21 0623)    Vaginal Bleeding: bloody show (21 1619)      Current Facility-Administered Medications:   •  diphenhydrAMINE (BENADRYL) capsule 25 mg, 25 mg, oral, Nightly PRN, Mayte Carrillo CNM, 25 mg at 21 2156  •  lactated ringer's infusion, , intravenous, Continuous, Carmen Dozier CNM  •  levothyroxine (SYNTHROID) tablet 88 mcg, 88 mcg, oral, Daily (6:30a), Sasha Jordan CNM, 88 mcg at 21 0814  •  miSOPROStoL (CYTOTEC) tablet 50 mcg, 50 mcg, oral, Once, Carmen Dozier CNM  •  prenatal vit no.130-iron-folic tablet 1 tablet, 1 tablet, oral, Daily, Sasha Jordan CNM    Assessment/Plan     Sherita Chavez is a 30 y.o. female  at 37w4d admitted with induction of labor secondary to gestational hypertension.     FHR: Category I  GBS: negative   Bps stable  Maternal HR 120s      1) Frequent contractions, but all very mild. Too " many for next dose cytotec. IVF bolus of  cc to be hung now. Will give next dose cytotec 50 mcg if contractions space.  2) Bps stable, continue to monitor per protocol.  3) Encouraged rest while not feeling contractions, oral hydration without restriction.  4) Dr. Jacobo to be informed when next available     Carmen Dozier CNM  3/19/2021

## 2021-03-19 NOTE — NURSING NOTE
Cook Cath removed by CARLOS Carrillo CNM provided 3cm/ 50% effaced/ -3 station.  Patient to eat breakfast and shower this morning prior to cytotec.

## 2021-03-19 NOTE — CONSULTS
"CARDIOLOGY CONSULT NOTE      Referred by: Jessica Benjamin, DO      SUBJECTIVE    Sherita Chavez is a 30 y.o. female who is admitted for Gestational hypertension, third trimester [O13.3]  Gestational hypertension affecting first pregnancy [O13.9].      This is a 29 yo F  at 37w4d admitted with induction of labor secondary to gestational hypertension. She has a history of palpitations and remote MVP along with SVT (dx  in setting of PNA) and PVCs.     From review of cardiology outpt notes, as soon as she found out she was pregnant her palpitations increased in frequency, intensity and duration.  She has 3 separate types of palpitations.  The first is the \"butterfly\" sensation which she has on a near daily basis.  A second type of palpitation might happen for just a few beats where she will feel a \"skip and then a pound\" such that she can almost see the blood rushing into her eyes.  This may also cause a feeling of dizziness very transiently.  The third type of palpitation is which she described as \"fibrillation\" or like a \"bowl of Jell-O\".  This happens every few days perhaps 2 or 3 times per week but on no occasion has she been presyncopal or had full syncope.    She had an echocardiogram on 2020 which showed normal left ventricular function and no evidence of mitral valve prolapse. She wore a 10 day event monitor which was notable for 2 runs of non-sustained SVT one lasting 6 beats and the second lasting 7 beats.     Since admission, BP has been within normal limits on VS checks. She has been in sinus tachycardia on all documented checks including ECG and printed out tele strips we reviewed. Bedside monitor shows sinus tach in 120s. We do not have a recorded telemetry but no e/o SVT or other non-sinus rhythm at this time.      Allergies: Ciprofloxacin, Moxifloxacin, Sulfa (sulfonamide antibiotics), and Moxifloxacin hcl    Home medications  •  prenatal vit no.130-iron-folic, Take 1 tablet by mouth " daily.  •  SYNTHROID, Take 1 tablet (88 mcg total) by mouth daily. Brand necessary    Inpatient medications  •  diphenhydrAMINE, 25 mg, oral, Nightly PRN  •  levothyroxine, 88 mcg, oral, Daily (6:30a)  •  prenatal vit no.130-iron-folic, 1 tablet, oral, Daily    History  Medical History:   Past Medical History:   Diagnosis Date   • Anemia    • Arrhythmia    • Hashimoto's disease    • High white blood cell cystine    • Hypertension    • Hypothyroidism    • IBS (irritable bowel syndrome)    • Migraines    • Mitral valve prolapse    • OCD (obsessive compulsive disorder)    • SVT (supraventricular tachycardia) (CMS/HCC)        Surgical History:   Past Surgical History:   Procedure Laterality Date   • SKIN BIOPSY     • WISDOM TOOTH EXTRACTION         Social History:   Social History     Socioeconomic History   • Marital status:      Spouse name: Dylon   • Number of children: 0   • Years of education: 16   • Highest education level: Bachelor's degree (e.g., BA, AB, BS)   Occupational History   • 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   Social Needs   • Financial resource strain: Not hard at all   • Food insecurity     Worry: Never true     Inability: Never true   • Transportation needs     Medical: No     Non-medical: No   Tobacco Use   • Smoking status: Never Smoker   • Smokeless tobacco: Never Used   Substance and Sexual Activity   • Alcohol use: Never     Frequency: Never   • Drug use: Never   • Sexual activity: Yes     Partners: Male   Lifestyle   • Physical activity     Days per week: None     Minutes per session: None   • Stress: None   Relationships   • Social connections     Talks on phone: None     Gets together: None     Attends Tenriism service: None     Active member of club or organization: None     Attends meetings of clubs or organizations: None     Relationship status: None   • Intimate partner violence     Fear of current or ex partner: None     Emotionally abused: None     Physically abused: None     " Forced sexual activity: None   Other Topics Concern   • None   Social History Narrative    Do you wear your seatbelt? Yes    Do you have smoke detector in your home? Yes    Do you have a carbon monoxide detector in your home? 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        Family History: Reviewed and non-contributory. Per EMR:  Family History   Problem Relation Age of Onset   • Colon cancer Paternal Grandfather    • Arrhythmia Paternal Grandfather    • No Known Problems Biological Mother    • Arrhythmia Biological Father    • No Known Problems Biological Brother    • Heart disease Maternal Grandmother    • Heart failure Maternal Grandmother    • Hyperlipidemia Maternal Grandmother    • Breast cancer Neg Hx          Review of Systems : Complete review of systems performed and negative except as noted in HPI.      OBJECTIVE  Visit Vitals  /70   Pulse (!) 108   Temp 36.8 °C (98.2 °F) (Oral)   Resp 18   Ht 1.626 m (5' 4\")   Wt 83.9 kg (185 lb)   SpO2 96%   Breastfeeding Yes   BMI 31.76 kg/m²       No intake or output data in the 24 hours ending 03/19/21 1128    Physical Exam   Constitutional: Well-developed and well-nourished. No distress.   HENT: Normocephalic and atraumatic. Moist mucous membranes.  Eyes: EOM are normal. Pupils are equal, round, and reactive to light.   Neck: No JVD present. No carotid bruits.  Cardiovascular: Normal rate and regular rhythm. No murmur, rub or gallop. Intact and symmetrical distal pulses.  Pulmonary/Chest: Normal effort and air entry. No wheezing, rales, ronchi.  Abdominal: Normal bowel sounds. Soft, non tender, no distension. No rebound or guarding.   Musculoskeletal: No lower extremity edema or deformity.   Neurological: Alert and oriented to person, place, and time.   Skin: Skin is warm and dry. No rash.  Psychiatric: Normal mood, affect and behavior.    Labs  Results from last 7 days   Lab Units 03/18/21  1731 03/16/21  1612   SODIUM mEQ/L  --  137 "   POTASSIUM mEQ/L  --  3.9   CHLORIDE mEQ/L  --  107   CO2 mEQ/L  --  21*   BUN mg/dL  --  <5*   CREATININE mg/dL 0.5* 0.6  0.4*   CALCIUM mg/dL  --  9.8   ALBUMIN g/dL  --  3.0*   BILIRUBIN TOTAL mg/dL  --  0.5   ALK PHOS IU/L  --  173*   ALT IU/L 45 56*  56*   AST IU/L 34 43*  42*   GLUCOSE mg/dL  --  77             Results from last 7 days   Lab Units 03/18/21  1731 03/16/21  1611   WBC K/uL 11.99* 11.40*   HEMOGLOBIN g/dL 12.9 13.3   HEMATOCRIT % 39.0 40.2   PLATELETS K/uL 198 209         Cardiology results    ECG : 3/16/21  Sinus tachycardia, rate 122  Other than rate, normal ECG    TTE : 10/2/20    · This is a normal study. The patient had a high heart rate for 1 to 2 minutes and the tracing showed sinus rhythm.  · Normal-sized LV. Normal LV wall thickness.  · Preserved LV systolic function. Estimated EF 60- 65%. Normal diastolic filling pattern for age of the LV.  · Normal-sized RV. Normal RV systolic function.  · Normal-sized LA. Normal doppler flow of pulmonary veins.  · Normal-sized RA.  · Aortic root normal.  · Aortic valve structure is normal. Tricuspid aortic valve.  · Normal leaflet structure of the mitral valve. There is no evidence of MVP.  · Trace mitral valve regurgitation.  · Trace tricuspid valve regurgitation.  · Estimated RVSP = 19 mmHg.  · Normal-sized IVC. IVC demonstrates normal respiratory collapse. Normal hepatic vein flow.  · No evidence of pericardial effusion.      ASSESSMENT AND PLAN  30 y.o. female, cardiology being consulted for:     PSVT (paroxysmal supraventricular tachycardia) (CMS/HCC)  Assessment & Plan  Currently in sinus rhythm  Would not initiate treatment for this  Hx of SVT dx 13 y ago in setting of PNA. Recent notes intermittently state 'adenosine sensitive' but pt states that adenosine x2 did not work at the time and her HR gradually decreased to the 120s spontaneously over time     If she goes into SVT, it would be reasonable to try repeat adenosine. No evidence of  SVT at this time, however.  We will follow with you as needed. Discussed with OB on unit. Please call if questions/concerns.        Jerrell Michelle MD  3/19/2021  11:28 AM

## 2021-03-19 NOTE — PROGRESS NOTES
JESSICA Adams CNM to assume care at 1930. Client is on birth ball. Just ate dinner. Plans for AROM and then resume pitocin.    Carmen Dozier CNM

## 2021-03-19 NOTE — PROGRESS NOTES
Labor and Delivery Progress Note    Consulted with Dr. Jacobo regarding lack of labor progress for Sherita. I suggested option of discharge with biweekly monitoring  Vs. Stopping pitocin for client to have dinner and and attempting Misoprostel or pitocin again overnight.  Dr. Jacobo requested to examine client for possible AROM.    Dr. Jacobo exam now 3-4/50/-2. Recommends either AROM or continue pitocin but does not recommend discharge.     Client and partner discussed options at length with me and requested I also examine cervix. I confirmed above exam. They would like to proceed with AROM. I offered to stop pitocin and have client eat dinner now. Will do AROM after dinner.      Carmen Dozier CNM  3/19/2021

## 2021-03-19 NOTE — PROGRESS NOTES
Labor and Delivery Progress Note    Interval history: Received report from CLAUDETTE Jordan CNM and assumed care of Sherita for night shift.    Subjective     Patient desires Natural Birth. She is feeling contractions but describes them as very mild. She is feeling settled and comfortable. Plans to listen to hypnobirthing recording and rest on her side.     Objective      Sherita is sitting up in bed with partner at bedside.     COVID test result negative. GHTN labs reviewed and are wnl, urine protein creatinine ratio is 0.22mg/g    Vital Signs:  Temp:  [37.2 °C (98.9 °F)] 37.2 °C (98.9 °F)  Heart Rate:  [112-127] 121  Resp:  [18] 18  BP: (112-140)/(67-89) 140/89     Fetal Monitoring:  Novii in use  FHR Baseline: 145  FHR Variability: moderate   FHR Accelerations: present, multiple > 15 x 15  FHR Decelerations: absent    Contraction Frequency: Q2-3 min, mild    IUPC: no    Cervical exam:  Cervical Dilation (cm): 0-1  Cervical Effacement: 10  Fetal Station: -3  Method: sterile exam per ALVINO (21)    Vaginal Bleeding: bloody show (21)      Current Facility-Administered Medications:   •  [START ON 3/19/2021] levothyroxine (SYNTHROID) tablet 88 mcg, 88 mcg, oral, Daily (6:30a), Sasha Jordan CNM  •  oxytocin in 0.9% NSS (PITOCIN) 20 unit/1000 mL infusion 10 Units, 500 mL, intravenous, Once **FOLLOWED BY** oxytocin in 0.9% NSS (PITOCIN) 20 unit/1000 mL infusion, , intravenous, Continuous, Sasha Jordan CNM  •  prenatal vit no.130-iron-folic tablet 1 tablet, 1 tablet, oral, Daily, Sasha Jordan CNM    Assessment/Plan     Sherita Chavez is a 30 y.o. female  at 37w3d admitted for induction of labor d/t Gestational Hypertension. Remote Hx SVT, currently tachycardic c/w EKG done 2 days ago showing sinus tachycardia. Asymptomatic.  FHR: Category I  GBS: negative   Cook balloon in place for cervical ripening.  Discussed plan for either misoprostol, cervidil, or pitocin as next step in labor  "induction depending on cervical ripeness and contraction pattern once cook balloon has been removed, and answered all questions.   Encouraged rest, clear fluids  Offered Benadryl for sleep aid, Sherita thinks she may like to do that, but notes that Benadryl makes her very drowsy and a half dose of Unisom once \"put me out for 8hrs\".  Order placed for only 25mg PO, PRN.    Mayte Carrillo, ALVINO            "

## 2021-03-19 NOTE — PROGRESS NOTES
Labor and Delivery Progress Note    Report received from CASEY Carrillo CNM at 0800    Subjective     Interval History: none. Sherita states she no longer feels any cramping or contractions. She had breakfast, showered and is feeling good. Partner Dylon is at her side. Sherita is currently on the EKG monitor as her HR has been 130-150 since eating breakfast. She states she feels her heart racing and is mildly SOB.    Patient desires Natural Birth.    Objective     Vital Signs for the last 24 hours:  Temp:  [36.8 °C (98.2 °F)-37.2 °C (98.9 °F)] 36.8 °C (98.2 °F)  Heart Rate:  [] 99  Resp:  [18] 18  BP: (106-140)/(63-89) 113/65     Fetal Monitoring:  FHR Baseline: 140  FHR Variability: moderate  FHR Accelerations: present  FHR Decelerations: absent     Contraction Frequency: irritability with [place]cam[place] monitor in place. Client does not appreciate any contractions.    IUPC: no    Latest cervical exam:  Cervical Dilation (cm): 3  Cervical Effacement: 50  Fetal Station: -3  Method: sterile exam per ALVINO (21 0623)    Vaginal Bleeding: bloody show (21 1619)      Current Facility-Administered Medications:   •  diphenhydrAMINE (BENADRYL) capsule 25 mg, 25 mg, oral, Nightly PRN, Mayte Carrillo CNM, 25 mg at 21 2156  •  levothyroxine (SYNTHROID) tablet 88 mcg, 88 mcg, oral, Daily (6:30a), Sasha Jordan CNM, 88 mcg at 21 0814  •  miSOPROStoL (CYTOTEC) tablet 25 mcg, 25 mcg, oral, Once, Carmen Dozier CNM  •  prenatal vit no.130-iron-folic tablet 1 tablet, 1 tablet, oral, Daily, Sasha Jordan CNM    Assessment/Plan     Sherita Chavez is a 30 y.o. female  at 37w4d admitted with induction of labor secondary to gestational hypertension. Hospital birth for hx of SVT.    FHR: Category I  GBS: negative     1) Discussed options for continued IOL with Sherita and her partner. Recommended misoprostel because Sherita is not corey and cervix only 50%. She agrees. Will begin with 25 mcg  buccally.  2) Tachycardia - Sherita to remain on EKG monitor if HR >120  3) I will consult with Dr. Jacobo when she is available.  4) Sherita shared her cardiologist's contact information should we need to consult with her during labor. (Dr. Mya Alvarenga [phone])    Carmen Dozier CNM  3/19/2021

## 2021-03-19 NOTE — PROGRESS NOTES
Labor and Delivery Progress Note    Subjective     Interval History: none. Sherita still not feeling contractions, other than as a tightening sensation. 1 liter LR infused.     Patient desires Natural Birth.    Objective     Vital Signs for the last 24 hours:  Temp:  [36.6 °C (97.9 °F)-37.2 °C (98.9 °F)] 36.6 °C (97.9 °F)  Heart Rate:  [] 108  Resp:  [18] 18  BP: (106-140)/(63-89) 111/70     Fetal Monitoring:  FHR Baseline:135  FHR Variability: moderate  FHR Accelerations: present  FHR Decelerations: absent     Contraction Frequency: q 3 minutes    IUPC: no    Latest cervical exam:  Cervical Dilation (cm): 3  Cervical Effacement: 50  Fetal Station: -3  Method: sterile exam per ALVINO (21 0623)    Vaginal Bleeding: bloody show (21 1619)   3/50/high    Current Facility-Administered Medications:   •  diphenhydrAMINE (BENADRYL) capsule 25 mg, 25 mg, oral, Nightly PRN, Mayte Carrillo CNM, 25 mg at 21 2156  •  lactated ringer's infusion, , intravenous, Continuous, Carmen Dozier CNM, Last Rate: 125 mL/hr at 21 1406, New Bag at 21 1406  •  lactated ringer's infusion, 125 mL/hr, intravenous, Continuous **AND** oxytocin in 0.9 % NSS (PITOCIN) 30 unit/500 mL infusion, 0-20 joce-units/min, intravenous, Continuous, Carmen Dozier CNM  •  levothyroxine (SYNTHROID) tablet 88 mcg, 88 mcg, oral, Daily (6:30a), Sasha Jordan CNM, 88 mcg at 21 0814  •  prenatal vit no.130-iron-folic tablet 1 tablet, 1 tablet, oral, Daily, Sasha Jordan CNM    Assessment/Plan     Sherita Chavez is a 30 y.o. female  at 37w4d admitted with induction of labor secondary to gestational hypertension.    FHR: Category I  GBS: negative     1) Unable to administer more cytotec as still corey too frequently after IVF. Will start pitocin and titrate per protocol.   2) Dr. Jacobo made aware of client progress.     Carmen Dozier CNM  3/19/2021

## 2021-03-19 NOTE — ASSESSMENT & PLAN NOTE
Currently in sinus rhythm  Would not initiate treatment for this  Hx of SVT dx 13 y ago in setting of PNA. Recent notes intermittently state 'adenosine sensitive' but pt states that adenosine x2 did not work at the time and her HR gradually decreased to the 120s spontaneously over time     If she goes into SVT, it would be reasonable to try repeat adenosine. No evidence of SVT at this time, however.  We will follow with you as needed. Discussed with OB on unit. Please call if questions/concerns.

## 2021-03-20 ENCOUNTER — ANESTHESIA EVENT (INPATIENT)
Dept: OBSTETRICS AND GYNECOLOGY | Facility: HOSPITAL | Age: 31
End: 2021-03-20
Payer: COMMERCIAL

## 2021-03-20 ENCOUNTER — ANESTHESIA (INPATIENT)
Dept: OBSTETRICS AND GYNECOLOGY | Facility: HOSPITAL | Age: 31
End: 2021-03-20
Payer: COMMERCIAL

## 2021-03-20 LAB
ALT SERPL-CCNC: 52 IU/L (ref 11–54)
AST SERPL-CCNC: 43 IU/L (ref 15–41)
CREAT SERPL-MCNC: 0.7 MG/DL (ref 0.6–1.1)
CREAT UR-MCNC: 32.6 MG/DL
ERYTHROCYTE [DISTWIDTH] IN BLOOD BY AUTOMATED COUNT: 16.1 % (ref 11.7–14.4)
GFR SERPL CREATININE-BSD FRML MDRD: >60 ML/MIN/1.73M*2
HCT VFR BLDCO AUTO: 40.5 % (ref 35–45)
HGB BLD-MCNC: 13.3 G/DL (ref 11.8–15.7)
LDH SERPL L TO P-CCNC: 171 IU/L (ref 98–192)
MCH RBC QN AUTO: 31.4 PG (ref 28–33.2)
MCHC RBC AUTO-ENTMCNC: 32.8 G/DL (ref 32.2–35.5)
MCV RBC AUTO: 95.5 FL (ref 83–98)
PDW BLD AUTO: 10 FL (ref 9.4–12.3)
PLATELET # BLD AUTO: 186 K/UL (ref 150–369)
PROT UR-MCNC: 10 MG/DL
PROT/CREAT UR: 0.31 MG/G CREAT
RBC # BLD AUTO: 4.24 M/UL (ref 3.93–5.22)
URATE SERPL-MCNC: 4.4 MG/DL (ref 2.6–8)
WBC # BLD AUTO: 12.21 K/UL (ref 3.8–10.5)

## 2021-03-20 PROCEDURE — 84460 ALANINE AMINO (ALT) (SGPT): CPT | Performed by: ADVANCED PRACTICE MIDWIFE

## 2021-03-20 PROCEDURE — 37000005 HC ANESTHESIA EPIDURAL/SPINAL

## 2021-03-20 PROCEDURE — 63600000 HC DRUGS/DETAIL CODE: Performed by: ADVANCED PRACTICE MIDWIFE

## 2021-03-20 PROCEDURE — 63700000 HC SELF-ADMINISTRABLE DRUG

## 2021-03-20 PROCEDURE — 84450 TRANSFERASE (AST) (SGOT): CPT | Performed by: ADVANCED PRACTICE MIDWIFE

## 2021-03-20 PROCEDURE — 83615 LACTATE (LD) (LDH) ENZYME: CPT | Performed by: ADVANCED PRACTICE MIDWIFE

## 2021-03-20 PROCEDURE — 63700000 HC SELF-ADMINISTRABLE DRUG: Performed by: ADVANCED PRACTICE MIDWIFE

## 2021-03-20 PROCEDURE — 82565 ASSAY OF CREATININE: CPT | Performed by: ADVANCED PRACTICE MIDWIFE

## 2021-03-20 PROCEDURE — 25000000 HC PHARMACY GENERAL: Performed by: ANESTHESIOLOGY

## 2021-03-20 PROCEDURE — 10907ZC DRAINAGE OF AMNIOTIC FLUID, THERAPEUTIC FROM PRODUCTS OF CONCEPTION, VIA NATURAL OR ARTIFICIAL OPENING: ICD-10-PCS | Performed by: ADVANCED PRACTICE MIDWIFE

## 2021-03-20 PROCEDURE — 84550 ASSAY OF BLOOD/URIC ACID: CPT | Performed by: ADVANCED PRACTICE MIDWIFE

## 2021-03-20 PROCEDURE — 63600000 HC DRUGS/DETAIL CODE: Performed by: ANESTHESIOLOGY

## 2021-03-20 PROCEDURE — 72000011 HC VAGINAL DELIVERY LEVEL 1

## 2021-03-20 PROCEDURE — 12000000 HC ROOM AND CARE MED/SURG

## 2021-03-20 PROCEDURE — 25800000 HC PHARMACY IV SOLUTIONS: Performed by: ANESTHESIOLOGY

## 2021-03-20 PROCEDURE — 36415 COLL VENOUS BLD VENIPUNCTURE: CPT | Performed by: ADVANCED PRACTICE MIDWIFE

## 2021-03-20 PROCEDURE — 84156 ASSAY OF PROTEIN URINE: CPT | Performed by: ADVANCED PRACTICE MIDWIFE

## 2021-03-20 PROCEDURE — 85027 COMPLETE CBC AUTOMATED: CPT | Performed by: ADVANCED PRACTICE MIDWIFE

## 2021-03-20 RX ORDER — LIDOCAINE HYDROCHLORIDE AND EPINEPHRINE 15; 5 MG/ML; UG/ML
INJECTION, SOLUTION EPIDURAL
Status: COMPLETED | OUTPATIENT
Start: 2021-03-20 | End: 2021-03-20

## 2021-03-20 RX ORDER — MISOPROSTOL 100 UG/1
TABLET ORAL
Status: COMPLETED
Start: 2021-03-20 | End: 2021-03-20

## 2021-03-20 RX ORDER — MISOPROSTOL 100 UG/1
50 TABLET ORAL ONCE
Status: COMPLETED | OUTPATIENT
Start: 2021-03-20 | End: 2021-03-20

## 2021-03-20 RX ORDER — EPHEDRINE SULFATE/0.9% NACL/PF 50 MG/5 ML
10 SYRINGE (ML) INTRAVENOUS AS NEEDED
Status: DISCONTINUED | OUTPATIENT
Start: 2021-03-20 | End: 2021-03-23 | Stop reason: HOSPADM

## 2021-03-20 RX ORDER — BUPIVACAINE HYDROCHLORIDE 2.5 MG/ML
INJECTION, SOLUTION EPIDURAL; INFILTRATION; INTRACAUDAL
Status: COMPLETED | OUTPATIENT
Start: 2021-03-20 | End: 2021-03-20

## 2021-03-20 RX ORDER — MISOPROSTOL 100 UG/1
50 TABLET ORAL ONCE
Status: ACTIVE | OUTPATIENT
Start: 2021-03-20 | End: 2021-03-21

## 2021-03-20 RX ADMIN — MISOPROSTOL 50 MCG: 100 TABLET ORAL at 12:20

## 2021-03-20 RX ADMIN — ROPIVACAINE HYDROCHLORIDE 50 ML: 2 INJECTION, SOLUTION EPIDURAL; INFILTRATION at 23:21

## 2021-03-20 RX ADMIN — BUPIVACAINE HYDROCHLORIDE 8 ML: 2.5 INJECTION, SOLUTION EPIDURAL; INFILTRATION; INTRACAUDAL at 23:28

## 2021-03-20 RX ADMIN — SODIUM CHLORIDE, POTASSIUM CHLORIDE, SODIUM LACTATE AND CALCIUM CHLORIDE: 600; 310; 30; 20 INJECTION, SOLUTION INTRAVENOUS at 23:38

## 2021-03-20 RX ADMIN — MISOPROSTOL 50 MCG: 100 TABLET ORAL at 16:19

## 2021-03-20 RX ADMIN — LIDOCAINE HYDROCHLORIDE,EPINEPHRINE BITARTRATE 5 ML: 15; .005 INJECTION, SOLUTION EPIDURAL; INFILTRATION; INTRACAUDAL; PERINEURAL at 23:28

## 2021-03-20 RX ADMIN — LEVOTHYROXINE SODIUM 88 MCG: 88 TABLET ORAL at 07:31

## 2021-03-20 NOTE — PROGRESS NOTES
Labor and Delivery Progress Note    Subjective     Interval History: none. Not feeling any contractions. Has been standing and swaying at bedside.    Patient desires Natural Birth.    Objective     Vital Signs for the last 24 hours:  Temp:  [36.5 °C (97.7 °F)-37 °C (98.6 °F)] 37 °C (98.6 °F)  Heart Rate:  [] 136  Resp:  [16-18] 16  BP: ()/(56-88) 112/76     Fetal Monitoring:  FHR Baseline: 150  FHR Variability: moderate  FHR Accelerations: present  FHR Decelerations: absent     Contraction Frequency: q 2-3 minutes     IUPC: no    Latest cervical exam:  Cervical Dilation (cm): 4  Cervical Effacement: 60  Fetal Station: -3  Method: sterile exam per ALVINO (21)    Vaginal Bleeding: bloody show (21)      Current Facility-Administered Medications:   •  lactated ringer's infusion, , intravenous, Continuous, Carmen Dozier CNM, Last Rate: 125 mL/hr at 21 1406, New Bag at 21 1406  •  levothyroxine (SYNTHROID) tablet 88 mcg, 88 mcg, oral, Daily (6:30a), Sasha Jordan CNM, 88 mcg at 21 0731  •  miSOPROStoL (CYTOTEC) tablet 50 mcg, 50 mcg, vaginal, Once, Carmen Dozier CNM  •  [] lactated ringer's infusion, 125 mL/hr, intravenous, Continuous, Last Rate: 125 mL/hr at 21 2249, 125 mL/hr at 21 2249 **AND** oxytocin in 0.9 % NSS (PITOCIN) 30 unit/500 mL infusion, 0-20 joce-units/min, intravenous, Continuous, Melly Adams CNM, Stopped at 21 0732  •  prenatal vit no.130-iron-folic tablet 1 tablet, 1 tablet, oral, Daily, Sasha Jordan CNM    Assessment/Plan     Sherita Chavez is a 30 y.o. female  at 37w5d admitted with induction of labor secondary to pre-eclampsia without severe features    FHR: Category I  GBS: negative     1) Head no longer ballotabl, so AROM for copious clear fluid.   2) Next dose cytotec due . Will evaluate for contraction patten and strength by patient report and by palpation  3) Encouraged  upright positions, standing, birth ball etc. And forward leaning positions.  4) Dr. Pak aware of client status  Report to be given to CLAUDETTE Jordan CNM @ 2000    Carmen Dozier CNM  3/20/2021

## 2021-03-20 NOTE — PROGRESS NOTES
Labs show urine Pro/creat ratio .31. Dr. Pak offered to consult with [facility], but Sherita and her partner have decided they would like to continue IOL. Will begina misoprostel vaginally per Dr. Pak's recommendation.    KATE Dozier CNM

## 2021-03-20 NOTE — PROGRESS NOTES
Report received from JESSICA Perez CNM. Nataliestates she slept at least 6 hours and feels great. She is waiting for her breakfast to arrive. Plan is to restart pitocin, do SVE and AROM if fetal station has dropped     Carmen Dozier CNM

## 2021-03-20 NOTE — PROGRESS NOTES
Labor and Delivery Progress Note    Subjective Sherita states she just woke from a good nap and feels refreshed. She states she just feels some occasional tightening and rates the contractions about a 1 on the pain scale.   Denies HA, visual changes or epigastric pain.  Interval History: none.     Patient desires Natural Birth.    Objective     Vital Signs for the last 24 hours:  Temp:  [36.5 °C (97.7 °F)-37 °C (98.6 °F)] 37 °C (98.6 °F)  Heart Rate:  [] 121  Resp:  [16-18] 16  BP: ()/(56-88) 110/63     Fetal Monitoring:  FHR Baseline: 130  FHR Variability: moderate  FHR Accelerations: present  FHR Decelerations: absent     Contraction Frequency: q 2-4 minutes, not appreciated by client    IUPC: no    Latest cervical exam:  Cervical Dilation (cm): 3-4  Cervical Effacement: 50  Fetal Station: -3  Method: sterile exam per ALVINO(KATE Haynes CNM) (21 1619)  Head ballotable    Vaginal Bleeding: bloody show (21)      Current Facility-Administered Medications:   •  miSOPROStoL (CYTOTEC) 100 mcg tablet  - Pyxis Override Pull, , , ,   •  lactated ringer's infusion, , intravenous, Continuous, Carmen Dozier CNM, Last Rate: 125 mL/hr at 21 1406, New Bag at 21 1406  •  levothyroxine (SYNTHROID) tablet 88 mcg, 88 mcg, oral, Daily (6:30a), Sasha Jordan CNM, 88 mcg at 21 0731  •  miSOPROStoL (CYTOTEC) tablet 50 mcg, 50 mcg, vaginal, Once, Carmen Dozier CNM  •  [] lactated ringer's infusion, 125 mL/hr, intravenous, Continuous, Last Rate: 125 mL/hr at 21 2249, 125 mL/hr at 21 224 **AND** oxytocin in 0.9 % NSS (PITOCIN) 30 unit/500 mL infusion, 0-20 joce-units/min, intravenous, Continuous, Melly Adams CNM, Stopped at 21 0732  •  prenatal vit no.130-iron-folic tablet 1 tablet, 1 tablet, oral, Daily, Sasha Jordan CNM    Assessment/Plan     Sherita Chavez is a 30 y.o. female  at 37w5d admitted with induction  of labor secondary to pre-eclampsia without severe features.  Bps normal.    FHR: Category I  GBS: negative   1) 50 mcg dose cytotec given vaginally in post fornix without difficulty. Dr. Pak aware of contraction pattern and states cytotec should continue as contractions not appreciated by client   2) Encourage client to stay in bed for the next hour, then try sitting on birth ball and doing hip circles  3) Plan will be to AROM as soon as head no longer ballotable.     Carmen Dozier CNM  3/20/2021

## 2021-03-20 NOTE — PROGRESS NOTES
Labor and Delivery Progress Note    Subjective     Interval History:   Pt states she is comfortable, notices mild cramping with contractions    Objective     Vital Signs for the last 24 hours:  Temp:  [36.6 °C (97.9 °F)-36.9 °C (98.4 °F)] 36.8 °C (98.2 °F)  Heart Rate:  [] 130  Resp:  [18] 18  BP: (106-136)/(63-87) 125/65 .vital     Fetal Monitoring:  FHR Baseline: 130   FHR Variability: mod  FHR Accelerations: pos  FHR Decelerations: neg     Contraction Frequency: q2-3min    Latest cervical exam:  3/50/hi (ballotable)    Vaginal Bleeding: bloody show (21)    Assessment/Plan     Sherita Chavez is a 30 y.o. female  at 37w4d admitted with induction of labor for gestational HTN  Too high for safe AROM, contx too frequent for misoprostel    FHR: Category I  GBS: negative    Plan:  Long discussion re: option to go home vs low dose pit all night and sedation to sleep; consider AROM in a.m.  Pt prefers to stay and continue IOL  Dr. Gilbert aware of pt status and agrees with low dose pit with sedation tonswetha Adams CNM

## 2021-03-20 NOTE — PROGRESS NOTES
Labor and Delivery Progress Note    Subjective     Interval History: none. Sherita has had a shower and is ready for continuation of IOL.   Patient desires Natural Birth.    Objective     Vital Signs for the last 24 hours:  Temp:  [36.5 °C (97.7 °F)-36.8 °C (98.2 °F)] 36.7 °C (98.1 °F)  Heart Rate:  [] 120  Resp:  [16] 16  BP: ()/(56-88) 119/81     Fetal Monitoring:  FHR Baseline: 150  FHR Variability: moderate  FHR Accelerations: present  FHR Decelerations: absent     Contraction Frequency: q 3 min    IUPC: no    Latest cervical exam:  Cervical Dilation (cm): 3-4  Cervical Effacement: 50  Fetal Station: -3  Method: sterile exam per ALVINO(MAREK Haynes CNM) (21 1220)    Vaginal Bleeding: bloody show (21)      Current Facility-Administered Medications:   •  lactated ringer's infusion, , intravenous, Continuous, Carmen Dozier CNM, Last Rate: 125 mL/hr at 21 1406, New Bag at 21 1406  •  levothyroxine (SYNTHROID) tablet 88 mcg, 88 mcg, oral, Daily (6:30a), Sasha Jordan CNM, 88 mcg at 21 0731  •  [] lactated ringer's infusion, 125 mL/hr, intravenous, Continuous, Last Rate: 125 mL/hr at 21 2249, 125 mL/hr at 21 2249 **AND** oxytocin in 0.9 % NSS (PITOCIN) 30 unit/500 mL infusion, 0-20 joce-units/min, intravenous, Continuous, Melly Adams CNM, Stopped at 21 0732  •  prenatal vit no.130-iron-folic tablet 1 tablet, 1 tablet, oral, Daily, Sasha Jordan CNM    Assessment/Plan     Sherita Chavez is a 30 y.o. female  at 37w5d admitted with induction of labor secondary to  pre-eclampsia with out severe features. BP normal, Maternal HR stable.    FHR: Category I  GBS: negative   1) Misoprostel 50 mcg placed in posterior fornix without difficulty.  2) Next dose at 1620.    Carmen Dozier CNM  3/20/2021

## 2021-03-20 NOTE — PLAN OF CARE
Problem: Adult Inpatient Plan of Care  Goal: Plan of Care Review  Outcome: Progressing  Flowsheets (Taken 3/20/2021 4875)  Plan of Care Reviewed With:   patient   spouse  Outcome Summary: Patient VSS, corey Q 2-3 with Cat I strip.  Goal: Patient-Specific Goal (Individualization)  Outcome: Progressing  Goal: Absence of Hospital-Acquired Illness or Injury  Outcome: Progressing  Goal: Optimal Comfort and Wellbeing  Outcome: Progressing  Goal: Readiness for Transition of Care  Outcome: Progressing     Problem: Bleeding (Labor)  Goal: Hemostasis  Outcome: Progressing     Problem: Change in Fetal Wellbeing (Labor)  Goal: Stable Fetal Wellbeing  Outcome: Progressing     Problem: Delayed Labor Progression (Labor)  Goal: Effective Progression to Delivery  Outcome: Progressing     Problem: Infection (Labor)  Goal: Absence of Infection Signs/Symptoms  Outcome: Progressing     Problem: Labor Pain (Labor)  Goal: Acceptable Pain Control  Outcome: Progressing     Problem: Uterine Tachysystole (Labor)  Goal: Normal Uterine Contraction Pattern  Outcome: Progressing   Plan of Care Review  Plan of Care Reviewed With: patient, spouse  Outcome Summary: Patient VSS, corey Q 2-3 with Cat I strip.

## 2021-03-20 NOTE — PROGRESS NOTES
Sherita has just finished breakfast and reports she feels no contractions.     Dr. Pak covering OB service today,  and I reviewed Sherita's case with her from diagnosis to current status. We reviewed all her BP readings since admission, her labs from 3/16 and 3/18, and her IOL course.   Dr. Pak recommends we stop attempts at IOL today, and offer client to go home with biweekly fetal monitoring and BP checks, weekly pre-e labs and home BP daily.   SVE 3-4/50/-3 and ballotable.   Pre-e labs to be drawn now.   Will switch to wired monitoring and do serial Bps    Discussed with Sherita and Dylon that if all WNL, they would have the option to go home with plan as above. The couple state they would likely choose that course.    KATE Dozier CNM

## 2021-03-21 PROCEDURE — 25800000 HC PHARMACY IV SOLUTIONS: Performed by: ANESTHESIOLOGY

## 2021-03-21 PROCEDURE — 63600000 HC DRUGS/DETAIL CODE: Performed by: ADVANCED PRACTICE MIDWIFE

## 2021-03-21 PROCEDURE — 63700000 HC SELF-ADMINISTRABLE DRUG

## 2021-03-21 PROCEDURE — 63700000 HC SELF-ADMINISTRABLE DRUG: Performed by: ADVANCED PRACTICE MIDWIFE

## 2021-03-21 PROCEDURE — 88307 TISSUE EXAM BY PATHOLOGIST: CPT | Performed by: ADVANCED PRACTICE MIDWIFE

## 2021-03-21 PROCEDURE — 25000000 HC PHARMACY GENERAL: Performed by: ADVANCED PRACTICE MIDWIFE

## 2021-03-21 PROCEDURE — 12000000 HC ROOM AND CARE MED/SURG

## 2021-03-21 PROCEDURE — 63600000 HC DRUGS/DETAIL CODE: Performed by: ANESTHESIOLOGY

## 2021-03-21 RX ORDER — LIDOCAINE HYDROCHLORIDE 10 MG/ML
INJECTION, SOLUTION EPIDURAL; INFILTRATION; INTRACAUDAL; PERINEURAL
Status: DISPENSED
Start: 2021-03-21 | End: 2021-03-22

## 2021-03-21 RX ORDER — IBUPROFEN 600 MG/1
600 TABLET ORAL EVERY 6 HOURS PRN
Status: DISCONTINUED | OUTPATIENT
Start: 2021-03-21 | End: 2021-03-23 | Stop reason: HOSPADM

## 2021-03-21 RX ORDER — IBUPROFEN 600 MG/1
TABLET ORAL
Status: DISPENSED
Start: 2021-03-21 | End: 2021-03-22

## 2021-03-21 RX ORDER — DIPHENHYDRAMINE HCL 25 MG
25 CAPSULE ORAL EVERY 6 HOURS PRN
Status: DISCONTINUED | OUTPATIENT
Start: 2021-03-21 | End: 2021-03-23 | Stop reason: HOSPADM

## 2021-03-21 RX ORDER — AMOXICILLIN 250 MG
1 CAPSULE ORAL 2 TIMES DAILY
Status: DISCONTINUED | OUTPATIENT
Start: 2021-03-21 | End: 2021-03-23 | Stop reason: HOSPADM

## 2021-03-21 RX ORDER — ALUMINUM HYDROXIDE, MAGNESIUM HYDROXIDE, AND SIMETHICONE 1200; 120; 1200 MG/30ML; MG/30ML; MG/30ML
30 SUSPENSION ORAL EVERY 4 HOURS PRN
Status: DISCONTINUED | OUTPATIENT
Start: 2021-03-21 | End: 2021-03-23 | Stop reason: HOSPADM

## 2021-03-21 RX ORDER — ACETAMINOPHEN 650 MG/1
SUPPOSITORY RECTAL
Status: DISPENSED
Start: 2021-03-21 | End: 2021-03-21

## 2021-03-21 RX ORDER — CALCIUM CARBONATE 200(500)MG
500 TABLET,CHEWABLE ORAL EVERY 4 HOURS PRN
Status: DISCONTINUED | OUTPATIENT
Start: 2021-03-21 | End: 2021-03-23 | Stop reason: HOSPADM

## 2021-03-21 RX ORDER — OXYTOCIN/0.9 % SODIUM CHLORIDE 20/1000 ML
PLASTIC BAG, INJECTION (ML) INTRAVENOUS CONTINUOUS
Status: DISCONTINUED | OUTPATIENT
Start: 2021-03-21 | End: 2021-03-23 | Stop reason: HOSPADM

## 2021-03-21 RX ORDER — ACETAMINOPHEN 650 MG/1
650 SUPPOSITORY RECTAL ONCE
Status: COMPLETED | OUTPATIENT
Start: 2021-03-21 | End: 2021-03-21

## 2021-03-21 RX ORDER — ACETAMINOPHEN 325 MG/1
650 TABLET ORAL EVERY 4 HOURS PRN
Status: DISCONTINUED | OUTPATIENT
Start: 2021-03-21 | End: 2021-03-23 | Stop reason: HOSPADM

## 2021-03-21 RX ORDER — DIPHENHYDRAMINE HCL 50 MG/ML
50 VIAL (ML) INJECTION ONCE
Status: COMPLETED | OUTPATIENT
Start: 2021-03-21 | End: 2021-03-21

## 2021-03-21 RX ADMIN — OXYTOCIN-SODIUM CHLORIDE 0.9% IV SOLN 30 UNIT/500ML 2 MILLI-UNITS/MIN: 30-0.9/5 SOLUTION at 08:19

## 2021-03-21 RX ADMIN — IBUPROFEN 600 MG: 600 TABLET ORAL at 18:06

## 2021-03-21 RX ADMIN — SODIUM CHLORIDE, POTASSIUM CHLORIDE, SODIUM LACTATE AND CALCIUM CHLORIDE: 600; 310; 30; 20 INJECTION, SOLUTION INTRAVENOUS at 04:40

## 2021-03-21 RX ADMIN — Medication: at 18:15

## 2021-03-21 RX ADMIN — DIPHENHYDRAMINE HYDROCHLORIDE 50 MG: 50 INJECTION INTRAMUSCULAR; INTRAVENOUS at 08:44

## 2021-03-21 RX ADMIN — ROPIVACAINE HYDROCHLORIDE 50 ML: 2 INJECTION, SOLUTION EPIDURAL; INFILTRATION at 14:29

## 2021-03-21 RX ADMIN — PRENATAL VIT W/ FE FUMARATE-FA TAB 27-0.8 MG 1 TABLET: 27-0.8 TAB at 19:30

## 2021-03-21 RX ADMIN — ROPIVACAINE HYDROCHLORIDE 50 ML: 2 INJECTION, SOLUTION EPIDURAL; INFILTRATION at 08:05

## 2021-03-21 RX ADMIN — Medication: at 15:41

## 2021-03-21 RX ADMIN — ROPIVACAINE HYDROCHLORIDE 50 ML: 2 INJECTION, SOLUTION EPIDURAL; INFILTRATION at 09:47

## 2021-03-21 RX ADMIN — ROPIVACAINE HYDROCHLORIDE 50 ML: 2 INJECTION, SOLUTION EPIDURAL; INFILTRATION at 04:09

## 2021-03-21 RX ADMIN — ACETAMINOPHEN 650 MG: 650 SUPPOSITORY RECTAL at 11:56

## 2021-03-21 RX ADMIN — ACETAMINOPHEN 650 MG: 325 TABLET, FILM COATED ORAL at 19:00

## 2021-03-21 RX ADMIN — LEVOTHYROXINE SODIUM 88 MCG: 88 TABLET ORAL at 05:57

## 2021-03-21 RX ADMIN — SENNOSIDES, DOCUSATE SODIUM 1 TABLET: 8.6; 5 TABLET ORAL at 19:30

## 2021-03-21 RX ADMIN — ROPIVACAINE HYDROCHLORIDE 50 ML: 2 INJECTION, SOLUTION EPIDURAL; INFILTRATION at 11:22

## 2021-03-21 NOTE — L&D DELIVERY NOTE
Spontaneous vaginal birth of living male over right mediolateral episiotomy at 1533.    Pt pushed with great effort in a variety of positions x 1hr 48min; in final hour perineal/labial edema became significant.  In final 5min FHTs to 50s-60s without recovery between contx; Perineum injected with local anesthetic and pt agreed to episiotomy.    While semi fowlers, baby OA-LANDRY immediately after episiotomy; loose nuchal cord x 1 reduced and baby delivered quickly into father's waiting hands and lifted up to pt for skin-to-skin.  Spont resp and apgars 7/8.  6#13oz.    Placenta by assisted maternal effort at 1538, and appeared intact by inspection with 3vc.  EBL 400cc, much of it from bleeding at episiotomy.    Repair under epidural and additional local anesthetic using 4-0 vicryl for bldg arterioles, 2-0 for two deep sutures, and 3- chromic and vicryl for remainder of repair.  Episiotomy extended to the capsule but capsule intact    Pt nursed with good latch during recovery and whole family demonstrated loving bonding.  Baby to NICU for 4hr obs due to single fever of 101 earlier today.

## 2021-03-21 NOTE — PROGRESS NOTES
Labor and Delivery Progress Note    Subjective   Rates pain 4/10. Reports improvement since bolus. Continuing to feel rectal pressure.    Objective   Vital Signs for the last 24 hours:  Temp:  [36.3 °C (97.4 °F)-37.1 °C (98.8 °F)] 36.3 °C (97.4 °F)  Heart Rate:  [] 134  Resp:  [16-18] 18  BP: ()/() 136/84     Fetal Monitoring:  FHR Baseline: 135  FHR Variability: moderate  FHR Accelerations: present  FHR Decelerations: absent  Contraction Frequency: q 2-4 min lasting 60 seconds  IUPC: no    Latest cervical exam:  Cervical Dilation (cm): 9  Cervical Effacement: 90  Fetal Station: -1  Method: sterile exam per ALVINO (21 0550)  Vaginal Bleeding: bloody show (21 0545)    Assessment/Plan   Sherita Chavez is a 30 y.o. female  at 37w6d admitted for induction of labor GHTN in active labor.    1) FHR: Category I. CEFM per [facility] protocol.  2) GBS:  negative  3) To labor down on right side with peanut ball and reassess in 30-60 minutes.  4) Expectant of .    Sasha Jordan CNM

## 2021-03-21 NOTE — ANESTHESIA PROCEDURE NOTES
Epidural Block    Patient location during procedure: OB  Start time: 3/20/2021 11:20 PM  End time: 3/20/2021 11:30 PM  Reason for block: labor analgesia requested by patient and obstetrician  Staffing  Anesthesiologist: Ramona Garcia DO  Performed: anesthesiologist   Preanesthetic Checklist  Completed: patient identified, surgical consent, pre-op evaluation, timeout performed, IV checked, risks and benefits discussed, monitors and equipment checked and sterile field maintained during procedure  Epidural  Patient position: sitting  Prep: ChloraPrep and site prepped and draped  Patient monitoring: heart rate, cardiac monitor, continuous pulse ox and blood pressure  Approach: midline  Location: lumbar (1-5)  Injection technique: SHALA saline  Needle  Needle type: NuMe Healthy   Needle gauge: 17 G  Needle length: 3.5 in  Needle insertion depth: 6 cm  Catheter type: Single orifice  Catheter size: 19 G  Catheter at skin depth: 9 cm  Test dose: negative and lidocaine 1.5% with epinephrine 1-to-200,000  Assessment  Sensory level: T10  Additional Notes  Procedure well tolerated. Vital signs stable. No paresthesia.  Analgesia satisfactory. Patients nurse to notify anesthesiologist if hemodynamically unstable.    Medications Administered - 3/20/2021 11:28 PM   Lidocaine 1.5%-EPINEPHrine 1:200,000 PF (XYLOCAINE W/EPI) injection, 5 mL  bupivacaine PF (MARCAINE) injection 0.25 %, 8 mL

## 2021-03-21 NOTE — PROGRESS NOTES
Labor and Delivery Progress Note  Late note to exam at 0830    Subjective   Sherita has been pushing for 1.75hr and describes herself as exhausted    Objective     Vital Signs for the last 24 hours:  Temp:  [36.3 °C (97.4 °F)-37.3 °C (99.2 °F)] 37.3 °C (99.2 °F)  Heart Rate:  [] 97  Resp:  [16-18] 18  BP: ()/() 135/87 .vital    Fetal Monitoring:  FHR Baseline: 140  FHR Variability: mod  FHR Accelerations: pos  FHR Decelerations: neg     Contraction Frequency: q2-4min lasting 40-50sec    Latest cervical exam:  After bladder emptied of 50cc urine, VE reveals large swollen cervical lip coming down to introitus with pt's pushing efforts; edematous cervix all the way around so exam is  /-1    Vaginal Bleeding: bloody show (21 0630)    Assessment/Plan   Sherita Chavez is a 30 y.o. female  at 37w6d admitted with induction of labor for preeclampsia without severe features    Inadequate uterine effort    Persistent OP position causing cervical lip and cervical edema from pushing on it    FHR: Category I  GBS: negative    Plan:  Dose up epidural  IV benadryl for cervical edema  Pitocin augmentation  Reassess in two hours  Dr. Gilbert aware of pt status      Melly Adams CNM

## 2021-03-21 NOTE — PROGRESS NOTES
Labor and Delivery Progress Note    Subjective   Interval History: has had some sleep, still feeling groggy; good pain relief; frustrated from long labor    Objective     Vital Signs for the last 24 hours:  Temp:  [36.3 °C (97.4 °F)-38.3 °C (101 °F)] 38.3 °C (101 °F)  Heart Rate:  [] 97  Resp:  [16-18] 18  BP: ()/() 135/87 .vital    Fetal Monitoring:  FHR Baseline: 150  FHR Variability: mod  FHR Accelerations: pos  FHR Decelerations: none     Contraction Frequency: q2-3min, > 60sec long    IUPC: no    Latest cervical exam:  Cervical Dilation (cm): 9.flow  Cervical Effacement: 100  Fetal Station: 0 /-1  Method: sterile exam per ALVINO (21 1129)    Vaginal Bleeding: bloody show (21 0630)    Assessment/Plan   Sherita Chavez is a 30 y.o. female  at 37w6d admitted with induction of labor, preeclampsia without severe features  Cervical lip is no longer swollen  Fever  FHR: Category I  GBS: negative    Plan:  Tylenol suppository and increase fluid rate  Reassess temp in one hour  Reassess cervix in two hours  Dr. Gilbert aware of pt status and agrees with plan of care    Melly Adams CNM

## 2021-03-21 NOTE — PROGRESS NOTES
Labor and Delivery Progress Note    Subjective   Think she may wants an epidural.    Objective   Vital Signs for the last 24 hours:  Temp:  [36.5 °C (97.7 °F)-37.1 °C (98.8 °F)] 37.1 °C (98.8 °F)  Heart Rate:  [] 124  Resp:  [16-18] 18  BP: ()/(56-88) 131/79     Fetal Monitoring:  FHR Baseline: 150  FHR Variability: moderate  FHR Accelerations: present  FHR Decelerations: absent  Contraction Frequency: q 2-4 min lasting seconds  IUPC: no    Latest cervical exam:  Cervical Dilation (cm): 6  Cervical Effacement: 80  Fetal Station: -2  Method: sterile exam per ALVINO (21)  Vaginal Bleeding: not present (21)    Assessment/Plan   Sherita Chavez is a 30 y.o. female  at 37w5d admitted for induction of labor GHTN in active labor.    1) FHR: Category I. CEFM per [facility] protocol.  2) GBS:  negative  3) To get epidural.  4) Allow client to labor without medication. Re-evaluate in 4-6 hours.    Sasha Jordan CNM

## 2021-03-21 NOTE — ANESTHESIOLOGIST PRE-PROCEDURE ATTESTATION
Pre-Procedure Patient Identification:  I am the Primary Anesthesiologist and have identified the patient on 03/20/21 at 11:11 PM.   I have confirmed the following procedure(s) * No procedures listed * will be performed by the following surgeon/proceduralist * No surgeons listed *.

## 2021-03-21 NOTE — ANESTHESIA PREPROCEDURE EVALUATION
Anesthesia ROS/MED HX    Anesthesia History - neg  Endo/Other   Hypothyroidism  Body Habitus: Obese      Relevant Problems   CARDIOVASCULAR   (+) Gestational hypertension affecting first pregnancy   (+) Mitral valve prolapse   (+) PSVT (paroxysmal supraventricular tachycardia) (CMS/HCC)   (+) PVC (premature ventricular contraction)      HEMATOLOGY   (+) Iron deficiency anemia      NEUROLOGY   (+) OCD (obsessive compulsive disorder)      Other   (+) Herpes labialis       Physical Exam    Airway   Mallampati: I   TM distance: <3 FB   Neck ROM: full  Cardiovascular - normal   Rhythm: regular   Rate: normalPulmonary - normal   clear to auscultation  Other Findings   Back - neg   landmarks identified        CBC Results       03/20/21 03/18/21 03/16/21                    0944 1731 1611         WBC 12.21 11.99 11.40         RBC 4.24 4.09 4.23         HGB 13.3 12.9 13.3         HCT 40.5 39.0 40.2         MCV 95.5 95.4 95.0         MCH 31.4 31.5 31.4         MCHC 32.8 33.1 33.1          198 209                       Anesthesia Plan    Plan: labor epidural   ASA 2  Anesthetic plan and risks discussed with: patient

## 2021-03-21 NOTE — NURSING NOTE
Pt called out c/o intense pain and requesting bladder scanner. Tylenol administered, and encouraged pt to attempt voiding. Pt assisted to bathroom, where pt successfully voided; hat was displaced so unable to measure output. Pt states feeling much better after voiding; encouraged frequent voiding. Pt verbalized understanding.

## 2021-03-21 NOTE — PROGRESS NOTES
Labor and Delivery Progress Note    Subjective   Resting comfortably with epidural.    Objective   Vital Signs for the last 24 hours:  Temp:  [36.7 °C (98.1 °F)-37.1 °C (98.8 °F)] 36.9 °C (98.4 °F)  Heart Rate:  [] 121  Resp:  [16-18] 18  BP: ()/() 129/67     Fetal Monitoring:  FHR Baseline: 150  FHR Variability: moderate  FHR Accelerations: present  FHR Decelerations: absent  Contraction Frequency: q 2-4 min lasting 60 seconds  IUPC: no    Latest cervical exam:  Cervical Dilation (cm): 7  Cervical Effacement: 90  Fetal Station: -2  Method: sterile exam per ALVINO (21)  Vaginal Bleeding: bloody show (21)    Assessment/Plan   Sherita Chavez is a 30 y.o. female  at 37w6d admitted for induction of labor GHTN in active labor.    1) FHR: Category I. CEFM per [facility] protocol.  2) GBS:  negative  3) Client repositioned on side with peanut ball.  4) Re-evaluatein 4 hrs or at client's request.   5) Expectant of .    Sasha Jordan CNM

## 2021-03-21 NOTE — PROGRESS NOTES
Labor and Delivery Progress Note    Subjective   Resting comfortably. Feels rectal pressure with cxts.    Objective   Vital Signs for the last 24 hours:  Temp:  [36.7 °C (98.1 °F)-37.1 °C (98.8 °F)] 36.7 °C (98.1 °F)  Heart Rate:  [] 116  Resp:  [16-18] 18  BP: ()/() 120/81     Fetal Monitoring:  FHR Baseline: 135  FHR Variability: moderate  FHR Accelerations: present  FHR Decelerations: absent  Contraction Frequency: q 2-4 min lasting 60 seconds  IUPC: no    Latest cervical exam:  Cervical Dilation (cm): 8-9  Cervical Effacement: 90  Fetal Station: -1  Method: sterile exam per ALVINO (21)  Vaginal Bleeding: bloody show (21)    Assessment/Plan   Sherita Chavez is a 30 y.o. female  at 37w6d admitted for induction of labor for GHTN in active labor.    1) FHR: Category I. CEFM per [facility] protocol.  2) GBS:  negative  3) Moved to sitting position.  4) Re-evaluate in 2 hrs or at client's request.Discussed pitocin if cxts slow down.  5) Expectant of .    Sasha Jordan CNM

## 2021-03-21 NOTE — PROGRESS NOTES
Labor and Delivery Progress Note    Subjective   Having back pain along with rectal pressure.    Objective   Vital Signs for the last 24 hours:  Temp:  [36.3 °C (97.4 °F)-37.1 °C (98.8 °F)] 36.6 °C (97.8 °F)  Heart Rate:  [] 142  Resp:  [16-18] 18  BP: ()/() 139/72     Fetal Monitoring:  FHR Baseline: 135  FHR Variability: moderate  FHR Accelerations: present  FHR Decelerations: present, late  Contraction Frequency: q2-4 min lasting 60 seconds  IUPC: no    Latest cervical exam:  Cervical Dilation (cm): 10  Cervical Effacement: 100  Fetal Station: 0  Method: sterile exam per ALVINO (21 0635)  Vaginal Bleeding: bloody show (21 0630)    Assessment/Plan   Sherita Chavez is a 30 y.o. female  at 37w6d admitted for induction of labor for GHTN  in second stage of labor.    1) FHR: Category II. CEFM per [facility] protocol. - Repositioned.  2) GBS:  negative  3) A-lip reduced. Start seconds stage of labor.  4) Expectant of .    Sasha Jordan CNM   Late Entry

## 2021-03-22 PROCEDURE — 27200130 HC EPIDURAL ANES TRAY

## 2021-03-22 PROCEDURE — 63700000 HC SELF-ADMINISTRABLE DRUG: Performed by: ADVANCED PRACTICE MIDWIFE

## 2021-03-22 PROCEDURE — 12000000 HC ROOM AND CARE MED/SURG

## 2021-03-22 RX ADMIN — LEVOTHYROXINE SODIUM 88 MCG: 88 TABLET ORAL at 06:19

## 2021-03-22 RX ADMIN — IBUPROFEN 600 MG: 600 TABLET ORAL at 06:17

## 2021-03-22 RX ADMIN — ACETAMINOPHEN 650 MG: 325 TABLET, FILM COATED ORAL at 14:43

## 2021-03-22 RX ADMIN — PRENATAL VIT W/ FE FUMARATE-FA TAB 27-0.8 MG 1 TABLET: 27-0.8 TAB at 09:06

## 2021-03-22 RX ADMIN — IBUPROFEN 600 MG: 600 TABLET ORAL at 11:57

## 2021-03-22 RX ADMIN — IBUPROFEN 600 MG: 600 TABLET ORAL at 20:59

## 2021-03-22 RX ADMIN — ACETAMINOPHEN 650 MG: 325 TABLET, FILM COATED ORAL at 19:35

## 2021-03-22 RX ADMIN — IBUPROFEN 600 MG: 600 TABLET ORAL at 00:27

## 2021-03-22 RX ADMIN — SENNOSIDES, DOCUSATE SODIUM 1 TABLET: 8.6; 5 TABLET ORAL at 09:07

## 2021-03-22 RX ADMIN — SENNOSIDES, DOCUSATE SODIUM 1 TABLET: 8.6; 5 TABLET ORAL at 19:36

## 2021-03-22 RX ADMIN — ACETAMINOPHEN 650 MG: 325 TABLET, FILM COATED ORAL at 09:06

## 2021-03-22 RX ADMIN — ACETAMINOPHEN 650 MG: 325 TABLET, FILM COATED ORAL at 03:06

## 2021-03-22 ASSESSMENT — PAIN SCALES - GENERAL: PAIN_LEVEL: 0

## 2021-03-22 NOTE — PROGRESS NOTES
Obstetrics Postpartum Progress Note    Subjective  No acute events overnight..  Ambulating, voiding, and eating without difficulty.  Pain mod-minimal, and well controlled with ordered medications.  Breastfeeding on demand and without difficulty. Asking about dx of SVT, has a hx, has an outpt cardiologist, s/p consult in house. Denies CP, SOB. States pulse still high, but she has a hx of this and just asking how she should be following.    Has many questions re: care of perineum, hemorrhoid.     Vitals  Temp:  [36.5 °C (97.7 °F)-37.3 °C (99.1 °F)] 36.5 °C (97.7 °F)  Heart Rate:  [100-174] 109  Resp:  [18] 18  BP: (117-137)/(57-89) 135/84    Physical Exam  General: well  Heart: Regular rate and rhythm  Lungs: Clear to auscultation bilaterally  Abdomen: soft, nondistended, non-tender  Fundus: firm and at umbilicus  Perineum: well approximated hemorrhoid present, pink and soft  Extremities: symmetric and 1+ edema    Labs  Labs Reviewed:  No results found for: ABO, LABRH Rubella: immune    Assessment/Plan   Sherita Chavez is a 30 y.o.  postpartum day 1 s/p Vaginal, Spontaneous .    1. Vital Signs: stable  2. Hemodynamics: stable  3. Pain: controlled  4. Postpartum care: meeting all goals   5.  TBC teaching done and Eliu given.  6.  Discharge home tomorrow.  7.  Dx SVP, intermittent, s/p cards consult. Will f/u with outpatient cardiology after delivery.  8.  Preeclampsia without severe features. PP HTN protocol reviewed. To take Bp at home and record. Will f/u with cardiology PP.  9.  TBC RN home visit 1-3 days after discharge.    Melly Reyes CNM

## 2021-03-22 NOTE — ANESTHESIA POSTPROCEDURE EVALUATION
Patient: Sherita Chavez    Procedure Summary     Date: 03/20/21 Room / Location:     Anesthesia Start: 2320 Anesthesia Stop: 03/21/21 1533    Procedure: Labor Analgesia Diagnosis:     Scheduled Providers:  Responsible Provider: Ramona Garcia DO    Anesthesia Type: labor epidural ASA Status: 2          Anesthesia Type: labor epidural  PACU Vitals     No data found in the last 10 encounters.            Anesthesia Post Evaluation    Pain score: 0  Pain management: satisfactory to patient  Patient location during evaluation: floor  Patient participation: complete - patient participated  Level of consciousness: awake and alert and awake  Cardiovascular status: acceptable  Airway Patency: adequate  Respiratory status: acceptable  Hydration status: stable  Pain well controlled after spinal preservative free morphine administration: Yes  Continue 24 hours observation after preservative free morphine administration: Yes  Anesthetic complications: no  Comments: Pt c/o resolving numbness over right lateral thigh,  Pt able to walk,no other significant complaints   Will continue to follow

## 2021-03-22 NOTE — PLAN OF CARE
Lactation visit. Breastfeeding book given.  Pt assisted with cc position.    Problem: Breastfeeding  Goal: Effective Breastfeeding  Outcome: Progressing  Intervention: Promote Effective Breastfeeding  Flowsheets  Taken 3/22/2021 1225 by Rocio Kelley RN  Breastfeeding Assistance:   assisted with positioning   feeding cue recognition promoted   feeding on demand promoted   feeding session observed   infant latch-on verified   infant stimulated to wakeful state   infant suck/swallow verified   support offered  Taken 3/22/2021 0906 by Iwona Perez RN  Parent/Child Attachment Promotion:   caring behavior modeled   cue recognition promoted   face-to-face positioning promoted   interaction encouraged   parent/caregiver presence encouraged   participation in care promoted   positive reinforcement provided   rooming-in promoted   strengths emphasized  Intervention: Support Exclusive Breastfeeding Success  Flowsheets (Taken 3/22/2021 1225)  Supportive Measures:   active listening utilized   counseling provided   positive reinforcement provided   verbalization of feelings encouraged  Breastfeeding Support:   diary/feeding log utilized   lactation counseling provided   maternal rest encouraged   maternal hydration promoted   encouragement provided   infant-mother separation minimized   maternal nutrition promoted

## 2021-03-23 VITALS
HEIGHT: 64 IN | HEART RATE: 122 BPM | RESPIRATION RATE: 18 BRPM | DIASTOLIC BLOOD PRESSURE: 83 MMHG | WEIGHT: 185 LBS | OXYGEN SATURATION: 99 % | TEMPERATURE: 97.8 F | BODY MASS INDEX: 31.58 KG/M2 | SYSTOLIC BLOOD PRESSURE: 123 MMHG

## 2021-03-23 PROBLEM — Z87.59 STATUS POST VACUUM-ASSISTED VAGINAL DELIVERY: Status: ACTIVE | Noted: 2021-03-23

## 2021-03-23 PROBLEM — O13.3 GESTATIONAL HYPERTENSION, THIRD TRIMESTER: Status: RESOLVED | Noted: 2021-03-18 | Resolved: 2021-03-23

## 2021-03-23 PROBLEM — O13.9 GESTATIONAL HYPERTENSION AFFECTING FIRST PREGNANCY: Status: RESOLVED | Noted: 2021-03-19 | Resolved: 2021-03-23

## 2021-03-23 LAB
CASE RPRT: NORMAL
PATH REPORT.FINAL DX SPEC: NORMAL
PATH REPORT.GROSS SPEC: NORMAL

## 2021-03-23 PROCEDURE — 63700000 HC SELF-ADMINISTRABLE DRUG: Performed by: ADVANCED PRACTICE MIDWIFE

## 2021-03-23 RX ADMIN — PRENATAL VIT W/ FE FUMARATE-FA TAB 27-0.8 MG 1 TABLET: 27-0.8 TAB at 08:57

## 2021-03-23 RX ADMIN — ACETAMINOPHEN 650 MG: 325 TABLET, FILM COATED ORAL at 08:57

## 2021-03-23 RX ADMIN — IBUPROFEN 600 MG: 600 TABLET ORAL at 11:29

## 2021-03-23 RX ADMIN — IBUPROFEN 600 MG: 600 TABLET ORAL at 05:25

## 2021-03-23 RX ADMIN — ACETAMINOPHEN 650 MG: 325 TABLET, FILM COATED ORAL at 00:32

## 2021-03-23 RX ADMIN — LEVOTHYROXINE SODIUM 88 MCG: 88 TABLET ORAL at 05:25

## 2021-03-23 NOTE — PROGRESS NOTES
Obstetrics Postpartum Progress Note    Events  No acute events overnight.  Breastfeeding well with a little soreness, no cracking. Still having numbness on R outer third of thigh. Motor control fine and strength fine, just less sensation. A little concerned about not being able to stop urine mid-stream and feels bowel control has been affected, however not incontinent of stool.     Subjective  Pain: no  Bleeding: lochia moderate  Diet: taking regular diet  Voiding: without difficulty  Bowel: passing flatus positive bowel movement  Ambulating: as tolerated    Vitals  Temp:  [36.5 °C (97.7 °F)-36.8 °C (98.2 °F)] 36.6 °C (97.8 °F)  Heart Rate:  [100-122] 122  Resp:  [18] 18  BP: (106-130)/(66-87) 123/83    I&O  No intake or output data in the 24 hours ending 21 0955    Physical Exam  General: well  Heart: regular rhythm, tachycardia in 120s  Lungs: Clear to auscultation bilaterally  Abdomen: soft, nondistended, non-tender  Fundus: firm, below umbilicus and nontender  Perineum: well approximated and well healing  Extremities: symmetric and 1+ edema    Labs  Labs Reviewed:  No results found for: ABO, LABRH       Assessment/Plan   Problem-based Assessment and Plan    Sherita Chavez is a 30 y.o.  postpartum day 2 s/p Vaginal, Spontaneous .    1. Vital Signs: stable  2. Hemodynamics: stable  3. Pain: controlled  4. VTE Assessment: Early Ambulation  5. Vaccinations/Rhogam: rhogam not indicated   6. Post care: meeting all goals   7. D/C instructions reviewed and plans for monitoring BP at home reviewed.   8. Plans on LOM and condoms for contraception.  9. RTO for LC visit and 6 wk exam.     Carmen Dozier CNM  3/23/2021

## 2021-03-23 NOTE — DISCHARGE SUMMARY
Inpatient Discharge Summary    BRIEF OVERVIEW  Admitting Provider: Mayte Jacobo MD  Discharge Provider:garrick Dozier CNM  Primary Care Physician at Discharge: Tanisha Plummer [phone]     Admission Date: 3/18/2021     Discharge Date: 3/23/2021    Primary Discharge Diagnosis  Gestational hypertension, third trimester    Secondary Discharge Diagnosis  Vacuum assisted vaginal delivery  Repair of RML Episiotomy    Discharge Disposition  Home     Discharge Medications     Medication List      CONTINUE taking these medications    prenatal vit no.130-iron-folic 27 mg iron- 800 mcg tablet tablet  Take 1 tablet by mouth daily.  Dose: 1 tablet     SYNTHROID 88 mcg tablet  Take 1 tablet (88 mcg total) by mouth daily. Brand necessary  Dose: 88 mcg  Generic drug: levothyroxine            Active Issues Requiring Follow-up  Issue: Lactation consult and 6 wk exam  Responsible Individual: LWC  What is Needed: appts  Follow-up Appointments Arranged: Yes     Outpatient Follow-Up  Encounter Information     You do not currently have any appointments scheduled.          Test Results Pending at Discharge  Pending Labs     Order Current Status    Pathology Tissue Exam Placenta; No In process          DETAILS OF HOSPITAL STAY    Presenting Problem/History of Present Illness  Gestational hypertension, third trimester [O13.3]  Gestational hypertension affecting first pregnancy [O13.9]      Hospital Course/Operative Procedures Performed

## 2021-03-23 NOTE — PLAN OF CARE
Plan of Care Review  Plan of Care Reviewed With: patient, spouse  Progress: improving  Outcome Summary: d/c orders received, education completed, all questions answered

## 2021-03-23 NOTE — DISCHARGE INSTRUCTIONS
POSTPARTUM DISCHARGE INSTRUCTIONS      If you had a vaginal delivery: Call [facility] for a postpartum appointment with any of our Midwives or Nurse Practitioners in 6wks.   If you had a  Section: The physicians may want to see you for an incision check in 1-2 weeks.  Call [facility] for a postpartum appointment with any of our Midwives or Nurse Practitioners.     Activities/Restrictions:    -No driving for 7-14 days.  No driving if taking Norco for pain.   -No baths or swimming for 6 weeks.  Showers only.   -No tampons, douching, intercourse for 6 weeks   -No heavy lifting more than the baby for 6 weeks.    -No heavy exercise for 6 weeks.      Medications:   -Please resume prenatal vitamins if you are breast feeding  -Colace 100mg take one tablet by mouth daily as needed for constipation. This is an over-the-counter medication.   -For hemorrhoids, use Tucks pads, Preparation H, Proctofoam as needed.  -For cracked/sore nipples you may use Lanolin cream.    Please resume your general diet.     To help with and episiotomy or vaginal laceration disomfort, you may:  1. Apply cold packs or chilled witch-maverick pads to the area  2. Take sitz baths (soaking in a few inches of warm water will help)  3. Use over the counter Dermaplast spray  4. Apply warm water to the area after urination using a squeeze water bottle  5. Always wipe from front to back to prevent infection    If you had a  section:  1. Keep you incision clean and dry.  2. You may let the water run over your incision in the shower but do not directly scrub the incision.  3. Do not apply creams or lotions to the incision  4. Your steri-strips may fall off on their own, otherwise remove them in the shower after 7 days    Please call the office if you have:  1. Heavy vaginal bleeding (soaking more than one pad per hour or passing clots larger than an egg)  2. Increasing redness or swelling at or near your incision or episiotomy site  3. Inability to tolerate  "food or drink without vomiting  4. Opening, bleeding, discharge or separation of your incision or episiotomy site  5. Foul smelling discharge  6. Chills or fever over 100.4 degrees Fahrenheit  7. Increasing pain (not relieved by pain medication)  8. Headache unrelieved by \"pain meds\"  9. New calf pain especially if only on one side  10. Unrelieved feelings of:  Inability to cope  Sadness  Anxiety  Lack of interest in baby  Insomnia  Crying    "

## 2021-06-02 ENCOUNTER — EVALUATION (OUTPATIENT)
Dept: PHYSICAL THERAPY | Facility: CLINIC | Age: 31
End: 2021-06-02
Payer: COMMERCIAL

## 2021-06-02 DIAGNOSIS — M54.50 ACUTE MIDLINE LOW BACK PAIN WITHOUT SCIATICA: ICD-10-CM

## 2021-06-02 DIAGNOSIS — R10.2 PELVIC PAIN: Primary | ICD-10-CM

## 2021-06-02 PROCEDURE — 97163 PT EVAL HIGH COMPLEX 45 MIN: CPT

## 2021-06-02 RX ORDER — LEVOTHYROXINE SODIUM 0.07 MG/1
TABLET ORAL DAILY
COMMUNITY

## 2021-06-02 NOTE — PROGRESS NOTES
PT Evaluation     Today's date: 2021  Patient name: Eugenia Wong  : 1990  MRN: [id number]  Referring provider: Mnauel Upton PT  Dx:   Encounter Diagnosis     ICD-10-CM    1  Pelvic pain  R10 2    2  Acute midline low back pain without sciatica  M54 5        Start Time: 1015  Stop Time: 1100  Total time in clinic (min): 45 minutes    Assessment  Assessment details: Patient is a 28 yo female presenting to pelvic floor physical therapy with symptoms consistent pelvic pain, gas incontinence and prolapse  Patient presents with decrease hip and core strength, decreased activity tolerance, LBP and hip pain, and the inability to fully empty her bowels  Patient is also unable to tolerate and well women exam without pain  Due to these impairments the patient is unable to fully participate in care giving for her son, ADLs and recreational activities without pelvic pain  PT educated the patient on the pelvic floor anatomy and function as well as bathroom mechanics  PT also educated the patient on performing desensitization techniques to the vaginal area where her scar as due to hypersensitivity  PT will address the noted impairments by performing hip, core and pelvic floor strengthening, stretching, balance, biofeedback, functional activities and manual techniques to allow the patient to return to her PLOF  PT recommended 1x/week for 6-8 weeks c a good prognosis 2* PLOF         Impairments: activity intolerance, impaired physical strength, lacks appropriate home exercise program, pain with function, poor posture  and poor body mechanics    Symptom irritability: highUnderstanding of Dx/Px/POC: good   Prognosis: good    Goals  STG: In four weeks the patient will:    1  Be (I) with her HEP  2  Increase hip and core strength to 4+/5 MMT score to assist c functional activities  3  Complete daily voiding and bowel log  4  Perform x10 diaphragmatic breathes without cues    5  Perform x 10 Kegels with a 4-5 second hold with proper breathing and relaxation of the pelvic floor muscles  LTG: In eight weeks, the patient will:    1  Perform x10 TA contractions with proper activation and no cues provided by PT  2   Void without straining in the proper body mechanics  3  tolerating speculum for well woman exam with minimal to no pain post    4  Increase hip and core strength to 5/5 MMT score to assist c prolonged activities  Plan  Patient would benefit from: skilled physical therapy and women's health eval  Planned modality interventions: biofeedback, cryotherapy and thermotherapy: hydrocollator packs  Planned therapy interventions: abdominal trunk stabilization, joint mobilization, manual therapy, massage, Romero taping, neuromuscular re-education, patient education, postural training, strengthening, stretching, therapeutic activities, therapeutic exercise, transfer training, home exercise program, functional ROM exercises, flexibility, breathing training, body mechanics training and balance  Frequency: 1x week  Duration in visits: 8  Duration in weeks: 8  Plan of Care beginning date: 6/2/2021  Plan of Care expiration date: 7/28/2021  Treatment plan discussed with: patient        PT Pelvic Floor Subjective:   History of Present Illness:       Delivery Type comment:  Occiput posteriorPatient is currently 10 weeks post partum with a hx of a cystocele and rectocele  Patient noted she was induced and was in active labor for 20 hours  Patient noted she pushed for two hours, however her pelvic floor was very swollen due to her pushing against her closed cervix  Patient then waited 8 hours and then pushed again  Patient noted she had a vaginal birth with a 2nd degree tear on the R with an episiotomy  Patient noted her baby boy was 6 lbs 13 oz and was born at 37 weeks due to pre-eclampsia  Patient noted right after birth for a few weeks she had a very difficult time with standing, sitting and walking   Patient noted she was cleared at her 6 week follow up with the OB for internal exams and intercourse, however patient had increased pain post OB exam  Patient currently has increased pain in the tailbone when performing a sit to stand  Patient noted she is continent with the bowel and bladder, however she is incontinent with gas  Patient noted she also has LBP and R/L hip pain with a butterfly stretch  Mechanism of injury: childbirth          Recurrent probem    Quality of life: fair    Social Support:     Lives in:  Multiple-level home (No pain with stairs)    Lives with:  Spouse and young children (11 week old boy)    Relationship status: /committed    Employment status: Not going back to work  Life stress level: 8    History of Depression: yes    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 -insomnia worst symptom  Hand dominance:  Right  Diet and Exercise:      Exercise type: walking    3 0 mph incline 6; yoga, wants to get stronger  Not exercising due to: lack of time  OB/ gyn History    Gestational History:     Prior Pregnancy: Yes      Number of prior pregnancies: 1    Number of term pregnancies: 1    Delivery Type: vaginal delivery      Number of vaginal deliveries: 1    Delivery Complications:  2nd degree tear (R) and episiotomy  Menstrual History:      Menstrual irregularities regular menses (did not get period yet from delivery)    Painful periods:  No difficulty managing menstrual pain    Tolerates tampons: yes  Bladder Function:     Voiding Difficulties negative for: urgency, frequent urination, straining and incomplete emptying      Voiding Difficulties comments:     Voiding frequency: every 1-2 hours and every 3-4 hours    Urinary leakage: no urine leakage    Nocturia (episodes per night): 2 and 3    Painful urination: No (Soreness)      Fluid Intake Type: Water and soda (Oat milk)    Intake (ounces): Water intake (oz): 3 L a day  Soda intake (oz): 1 can of soda a day     Incontinence Management: Pads/Diaper Use:  None    Patient has attempted at least 4 weeks of independent pelvic floor strengthening exercises without a resolution of symptoms  Bowel Function:     Voiding DIfficulties: urgency and unfinished feeling after defecating      [place] Stool Scale: type 4 and type 5    Stool softener use: no stool softeners    Enema use: no enema    Uses "squatty potty": no Squatty Potty  Sexual Function:     Sexually Active:  Not sexually active, sexually active and single partner    pain causes abstinence    Patient wishes to return to having intercourse: currently unable to have intercourse but wants to  Pain:     Current pain ratin    At best pain ratin    At worst pain ratin (during 6 week check up)    Location:  R side internally in the pelvis where the tear is    Onset:  1-3 months ago    Quality:  Dull ache (bulging sensation)    Aggravating factors:  Sit to stand transition, intercourse, bowel movements, exercise and walking    Duration of symptoms:  Less than 1 hour    Relieving factors:  Rest    Progression:  Improved  Patient Goals:     Patient goals for therapy:  Improved pain management, improved quality of life, relaxation, return to sport/leisure activities, improved comfort, improved bladder or bowel function, decreased pain and decreased interpersonal problems    Other patient goals:  "to tolerate a well women exam without pain " "to get stronger for return to exercise "      Objective     Strength/Myotome Testing     Left Hip   Planes of Motion   Flexion: 4-  Extension: 4-  Abduction: 4-  Adduction: 4-    Right Hip   Planes of Motion   Flexion: 4-  Extension: 4-  Abduction: 4-  Adduction: 4-  Pelvic Floor Exam   Abdominal assessment: To be performed at next session  Diastatis   Palpation of linea alba: To be performed at next session  General Perineum Exam:     General perineum exam comments: To be performed at next session       Visual Inspection of Perineum:   PFM Contraction Comments: To be performed at next session  Pelvic Organ Prolapse   Comments: To be performed at next session  Pelvic Floor Muscle Exam       PERFECT Score   Right power: To be performed at next session  Left power: To be performed at next session  Precautions: hx of post partum depression         Manuals 6/2            Internal exam nv            External exam nv            Abdominal assessment nv                         Neuro Re-Ed             Pelvic floor anatomy and function, bladder logs and bathroom mechanics edu MW            TA nv            kegel nv            Diaphragmatic breathing nv                                                   Ther Ex             Open books nv            Butterfly stretch nv            Piriformis stetch nv            Hip add nv            Hip abd nv                                                   Ther Activity                                       Gait Training                                       Modalities

## 2021-06-02 NOTE — LETTER
2021      No Recipients    Patient: Raine Donovan   YOB: 1990   Date of Visit: 2021     Encounter Diagnosis     ICD-10-CM    1  Pelvic pain  R10 2    2  Acute midline low back pain without sciatica  M54 5        Dear Dr Andrew Agee:    Thank you for your recent referral of Raine Donovan  Please review the attached evaluation summary from Cherrie's recent visit  Please verify that you agree with the plan of care by signing the attached order  If you have any questions or concerns, please do not hesitate to call  I sincerely appreciate the opportunity to share in the care of one of your patients and hope to have another opportunity to work with you in the near future  Sincerely,    Ashly Eugene, PT      Referring Provider:      I certify that I have read the below Plan of Care and certify the need for these services furnished under this plan of treatment while under my care  No Recipients          PT Evaluation     Today's date: 2021  Patient name: Raine Donovan  : 1990  MRN: [id number]  Referring provider: Carey Mata PT  Dx:   Encounter Diagnosis     ICD-10-CM    1  Pelvic pain  R10 2    2  Acute midline low back pain without sciatica  M54 5        Start Time: 1015  Stop Time: 1100  Total time in clinic (min): 45 minutes    Assessment  Assessment details: Patient is a 28 yo female presenting to pelvic floor physical therapy with symptoms consistent pelvic pain, gas incontinence and prolapse  Patient presents with decrease hip and core strength, decreased activity tolerance, LBP and hip pain, and the inability to fully empty her bowels  Patient is also unable to tolerate and well women exam without pain  Due to these impairments the patient is unable to fully participate in care giving for her son, ADLs and recreational activities without pelvic pain   PT educated the patient on the pelvic floor anatomy and function as well as bathroom mechanics  PT also educated the patient on performing desensitization techniques to the vaginal area where her scar as due to hypersensitivity  PT will address the noted impairments by performing hip, core and pelvic floor strengthening, stretching, balance, biofeedback, functional activities and manual techniques to allow the patient to return to her PLOF  PT recommended 1x/week for 6-8 weeks c a good prognosis 2* PLOF         Impairments: activity intolerance, impaired physical strength, lacks appropriate home exercise program, pain with function, poor posture  and poor body mechanics    Symptom irritability: highUnderstanding of Dx/Px/POC: good   Prognosis: good    Goals  STG: In four weeks the patient will:    1  Be (I) with her HEP  2  Increase hip and core strength to 4+/5 MMT score to assist c functional activities  3  Complete daily voiding and bowel log  4  Perform x10 diaphragmatic breathes without cues  5  Perform x 10 Kegels with a 4-5 second hold with proper breathing and relaxation of the pelvic floor muscles  LTG: In eight weeks, the patient will:    1  Perform x10 TA contractions with proper activation and no cues provided by PT  2   Void without straining in the proper body mechanics  3  tolerating speculum for well woman exam with minimal to no pain post    4  Increase hip and core strength to 5/5 MMT score to assist c prolonged activities         Plan  Patient would benefit from: skilled physical therapy and women's health eval  Planned modality interventions: biofeedback, cryotherapy and thermotherapy: hydrocollator packs  Planned therapy interventions: abdominal trunk stabilization, joint mobilization, manual therapy, massage, Romero taping, neuromuscular re-education, patient education, postural training, strengthening, stretching, therapeutic activities, therapeutic exercise, transfer training, home exercise program, functional ROM exercises, flexibility, breathing training, body mechanics training and balance  Frequency: 1x week  Duration in visits: 8  Duration in weeks: 8  Plan of Care beginning date: 6/2/2021  Plan of Care expiration date: 7/28/2021  Treatment plan discussed with: patient        PT Pelvic Floor Subjective:   History of Present Illness:       Delivery Type comment:  Occiput posteriorPatient is currently 10 weeks post partum with a hx of a cystocele and rectocele  Patient noted she was induced and was in active labor for 20 hours  Patient noted she pushed for two hours, however her pelvic floor was very swollen due to her pushing against her closed cervix  Patient then waited 8 hours and then pushed again  Patient noted she had a vaginal birth with a 2nd degree tear on the R with an episiotomy  Patient noted her baby boy was 6 lbs 13 oz and was born at 37 weeks due to pre-eclampsia  Patient noted right after birth for a few weeks she had a very difficult time with standing, sitting and walking  Patient noted she was cleared at her 6 week follow up with the OB for internal exams and intercourse, however patient had increased pain post OB exam  Patient currently has increased pain in the tailbone when performing a sit to stand  Patient noted she is continent with the bowel and bladder, however she is incontinent with gas  Patient noted she also has LBP and R/L hip pain with a butterfly stretch  Mechanism of injury: childbirth          Recurrent probem    Quality of life: fair    Social Support:     Lives in:  Multiple-level home (No pain with stairs)    Lives with:  Spouse and young children (11 week old boy)    Relationship status: /committed    Employment status: Not going back to work  Life stress level: 8    History of Depression: yes    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 -insomnia worst symptom  Hand dominance:  Right  Diet and Exercise:      Exercise type: walking    3 0 mph incline 6; yoga, wants to get stronger       Not exercising due to: lack of time  OB/ gyn History    Gestational History:     Prior Pregnancy: Yes      Number of prior pregnancies: 1    Number of term pregnancies: 1    Delivery Type: vaginal delivery      Number of vaginal deliveries: 1    Delivery Complications:  2nd degree tear (R) and episiotomy  Menstrual History:      Menstrual irregularities regular menses (did not get period yet from delivery)    Painful periods:  No difficulty managing menstrual pain    Tolerates tampons: yes  Bladder Function:     Voiding Difficulties negative for: urgency, frequent urination, straining and incomplete emptying      Voiding Difficulties comments:     Voiding frequency: every 1-2 hours and every 3-4 hours    Urinary leakage: no urine leakage    Nocturia (episodes per night): 2 and 3    Painful urination: No (Soreness)      Fluid Intake Type: Water and soda (Oat milk)    Intake (ounces): Water intake (oz): 3 L a day  Soda intake (oz): 1 can of soda a day     Incontinence Management:     Pads/Diaper Use:  None    Patient has attempted at least 4 weeks of independent pelvic floor strengthening exercises without a resolution of symptoms  Bowel Function:     Voiding DIfficulties: urgency and unfinished feeling after defecating      [place] Stool Scale: type 4 and type 5    Stool softener use: no stool softeners    Enema use: no enema    Uses "squatty potty": no Squatty Potty  Sexual Function:     Sexually Active:  Not sexually active, sexually active and single partner    pain causes abstinence    Patient wishes to return to having intercourse: currently unable to have intercourse but wants to  Pain:     Current pain ratin    At best pain ratin    At worst pain ratin (during 6 week check up)    Location:  R side internally in the pelvis where the tear is    Onset:  1-3 months ago    Quality:  Dull ache (bulging sensation)    Aggravating factors:  Sit to stand transition, intercourse, bowel movements, exercise and walking    Duration of symptoms:  Less than 1 hour    Relieving factors:  Rest    Progression:  Improved  Patient Goals:     Patient goals for therapy:  Improved pain management, improved quality of life, relaxation, return to sport/leisure activities, improved comfort, improved bladder or bowel function, decreased pain and decreased interpersonal problems    Other patient goals:  "to tolerate a well women exam without pain " "to get stronger for return to exercise "      Objective     Strength/Myotome Testing     Left Hip   Planes of Motion   Flexion: 4-  Extension: 4-  Abduction: 4-  Adduction: 4-    Right Hip   Planes of Motion   Flexion: 4-  Extension: 4-  Abduction: 4-  Adduction: 4-  Pelvic Floor Exam   Abdominal assessment: To be performed at next session  Diastatis   Palpation of linea alba: To be performed at next session  General Perineum Exam:     General perineum exam comments: To be performed at next session  Visual Inspection of Perineum:   PFM Contraction Comments: To be performed at next session  Pelvic Organ Prolapse   Comments: To be performed at next session  Pelvic Floor Muscle Exam       PERFECT Score   Right power: To be performed at next session  Left power: To be performed at next session  Precautions: hx of post partum depression         Manuals 6/2            Internal exam nv            External exam nv            Abdominal assessment nv                         Neuro Re-Ed             Pelvic floor anatomy and function, bladder logs and bathroom mechanics edu MW            TA nv            kegel nv            Diaphragmatic breathing nv                                                   Ther Ex             Open books nv            Butterfly stretch nv            Piriformis stetch nv            Hip add nv            Hip abd nv                                                   Ther Activity                                       Gait Training Modalities

## 2021-06-02 NOTE — LETTER
2021    Asad Conway, [address]  [facility] [address]    Patient: Germain Mckee   YOB: 1990   Date of Visit: 2021     Encounter Diagnosis     ICD-10-CM    1  Pelvic pain  R10 2    2  Acute midline low back pain without sciatica  M54 5        Dear Dr Graham Lipoma: Thank you for your recent referral of Germain Mckee  Please review the attached evaluation summary from Cherrie's recent visit  Please verify that you agree with the plan of care by signing the attached order  If you have any questions or concerns, please do not hesitate to call  I sincerely appreciate the opportunity to share in the care of one of your patients and hope to have another opportunity to work with you in the near future  Sincerely,    Benjamin Molina, PT      Referring Provider:      I certify that I have read the below Plan of Care and certify the need for these services furnished under this plan of treatment while under my care  Asad Conway, [address] [place] [id number]  Via Fax: [phone]          PT Evaluation     Today's date: 2021  Patient name: Germain Mckee  : 1990  MRN: [id number]  Referring provider: Arabella Covarrubias, PT  Dx:   Encounter Diagnosis     ICD-10-CM    1  Pelvic pain  R10 2    2  Acute midline low back pain without sciatica  M54 5        Start Time: 1015  Stop Time: 1100  Total time in clinic (min): 45 minutes    Assessment  Assessment details: Patient is a 28 yo female presenting to pelvic floor physical therapy with symptoms consistent pelvic pain, gas incontinence and prolapse  Patient presents with decrease hip and core strength, decreased activity tolerance, LBP and hip pain, and the inability to fully empty her bowels  Patient is also unable to tolerate and well women exam without pain   Due to these impairments the patient is unable to fully participate in care giving for her son, ADLs and recreational activities without pelvic pain  PT educated the patient on the pelvic floor anatomy and function as well as bathroom mechanics  PT also educated the patient on performing desensitization techniques to the vaginal area where her scar as due to hypersensitivity  PT will address the noted impairments by performing hip, core and pelvic floor strengthening, stretching, balance, biofeedback, functional activities and manual techniques to allow the patient to return to her PLOF  PT recommended 1x/week for 6-8 weeks c a good prognosis 2* PLOF         Impairments: activity intolerance, impaired physical strength, lacks appropriate home exercise program, pain with function, poor posture  and poor body mechanics    Symptom irritability: highUnderstanding of Dx/Px/POC: good   Prognosis: good    Goals  STG: In four weeks the patient will:    1  Be (I) with her HEP  2  Increase hip and core strength to 4+/5 MMT score to assist c functional activities  3  Complete daily voiding and bowel log  4  Perform x10 diaphragmatic breathes without cues  5  Perform x 10 Kegels with a 4-5 second hold with proper breathing and relaxation of the pelvic floor muscles  LTG: In eight weeks, the patient will:    1  Perform x10 TA contractions with proper activation and no cues provided by PT  2   Void without straining in the proper body mechanics  3  tolerating speculum for well woman exam with minimal to no pain post    4  Increase hip and core strength to 5/5 MMT score to assist c prolonged activities         Plan  Patient would benefit from: skilled physical therapy and women's health eval  Planned modality interventions: biofeedback, cryotherapy and thermotherapy: hydrocollator packs  Planned therapy interventions: abdominal trunk stabilization, joint mobilization, manual therapy, massage, Romero taping, neuromuscular re-education, patient education, postural training, strengthening, stretching, therapeutic activities, therapeutic exercise, transfer training, home exercise program, functional ROM exercises, flexibility, breathing training, body mechanics training and balance  Frequency: 1x week  Duration in visits: 8  Duration in weeks: 8  Plan of Care beginning date: 6/2/2021  Plan of Care expiration date: 7/28/2021  Treatment plan discussed with: patient        PT Pelvic Floor Subjective:   History of Present Illness:       Delivery Type comment:  Occiput posteriorPatient is currently 10 weeks post partum with a hx of a cystocele and rectocele  Patient noted she was induced and was in active labor for 20 hours  Patient noted she pushed for two hours, however her pelvic floor was very swollen due to her pushing against her closed cervix  Patient then waited 8 hours and then pushed again  Patient noted she had a vaginal birth with a 2nd degree tear on the R with an episiotomy  Patient noted her baby boy was 6 lbs 13 oz and was born at 37 weeks due to pre-eclampsia  Patient noted right after birth for a few weeks she had a very difficult time with standing, sitting and walking  Patient noted she was cleared at her 6 week follow up with the OB for internal exams and intercourse, however patient had increased pain post OB exam  Patient currently has increased pain in the tailbone when performing a sit to stand  Patient noted she is continent with the bowel and bladder, however she is incontinent with gas  Patient noted she also has LBP and R/L hip pain with a butterfly stretch  Mechanism of injury: childbirth          Recurrent probem    Quality of life: fair    Social Support:     Lives in:  Multiple-level home (No pain with stairs)    Lives with:  Spouse and young children (11 week old boy)    Relationship status: /committed    Employment status: Not going back to work      Life stress level: 8    History of Depression: yes    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 -insomnia worst symptom  Hand dominance:  Right  Diet and Exercise:      Exercise type: walking    3 0 mph incline 6; yoga, wants to get stronger  Not exercising due to: lack of time  OB/ gyn History    Gestational History:     Prior Pregnancy: Yes      Number of prior pregnancies: 1    Number of term pregnancies: 1    Delivery Type: vaginal delivery      Number of vaginal deliveries: 1    Delivery Complications:  2nd degree tear (R) and episiotomy  Menstrual History:      Menstrual irregularities regular menses (did not get period yet from delivery)    Painful periods:  No difficulty managing menstrual pain    Tolerates tampons: yes  Bladder Function:     Voiding Difficulties negative for: urgency, frequent urination, straining and incomplete emptying      Voiding Difficulties comments:     Voiding frequency: every 1-2 hours and every 3-4 hours    Urinary leakage: no urine leakage    Nocturia (episodes per night): 2 and 3    Painful urination: No (Soreness)      Fluid Intake Type: Water and soda (Oat milk)    Intake (ounces): Water intake (oz): 3 L a day  Soda intake (oz): 1 can of soda a day     Incontinence Management:     Pads/Diaper Use:  None    Patient has attempted at least 4 weeks of independent pelvic floor strengthening exercises without a resolution of symptoms  Bowel Function:     Voiding DIfficulties: urgency and unfinished feeling after defecating      Lyon Stool Scale: type 4 and type 5    Stool softener use: no stool softeners    Enema use: no enema    Uses "squatty potty": no Squatty Potty  Sexual Function:     Sexually Active:  Not sexually active, sexually active and single partner    pain causes abstinence    Patient wishes to return to having intercourse: currently unable to have intercourse but wants to  Pain:     Current pain ratin    At best pain ratin    At worst pain ratin (during 6 week check up)    Location:  R side internally in the pelvis where the tear is    Onset: 1-3 months ago    Quality:  Dull ache (bulging sensation)    Aggravating factors:  Sit to stand transition, intercourse, bowel movements, exercise and walking    Duration of symptoms:  Less than 1 hour    Relieving factors:  Rest    Progression:  Improved  Patient Goals:     Patient goals for therapy:  Improved pain management, improved quality of life, relaxation, return to sport/leisure activities, improved comfort, improved bladder or bowel function, decreased pain and decreased interpersonal problems    Other patient goals:  "to tolerate a well women exam without pain " "to get stronger for return to exercise "      Objective     Strength/Myotome Testing     Left Hip   Planes of Motion   Flexion: 4-  Extension: 4-  Abduction: 4-  Adduction: 4-    Right Hip   Planes of Motion   Flexion: 4-  Extension: 4-  Abduction: 4-  Adduction: 4-  Pelvic Floor Exam   Abdominal assessment: To be performed at next session  Diastatis   Palpation of linea alba: To be performed at next session  General Perineum Exam:     General perineum exam comments: To be performed at next session  Visual Inspection of Perineum:   PFM Contraction Comments: To be performed at next session  Pelvic Organ Prolapse   Comments: To be performed at next session  Pelvic Floor Muscle Exam       PERFECT Score   Right power: To be performed at next session  Left power: To be performed at next session  Precautions: hx of post partum depression         Manuals 6/2            Internal exam nv            External exam nv            Abdominal assessment nv                         Neuro Re-Ed             Pelvic floor anatomy and function, bladder logs and bathroom mechanics edu MW            TA nv            kegel nv            Diaphragmatic breathing nv                                                   Ther Ex             Open books nv            Butterfly stretch nv            Piriformis stetch nv            Hip add nv Hip abd nv                                                   Ther Activity                                       Gait Training                                       Modalities

## 2021-06-07 ENCOUNTER — OFFICE VISIT (OUTPATIENT)
Dept: PHYSICAL THERAPY | Facility: CLINIC | Age: 31
End: 2021-06-07
Payer: COMMERCIAL

## 2021-06-07 DIAGNOSIS — M54.50 ACUTE MIDLINE LOW BACK PAIN WITHOUT SCIATICA: ICD-10-CM

## 2021-06-07 DIAGNOSIS — R10.2 PELVIC PAIN: Primary | ICD-10-CM

## 2021-06-07 PROCEDURE — 97140 MANUAL THERAPY 1/> REGIONS: CPT

## 2021-06-07 PROCEDURE — 97112 NEUROMUSCULAR REEDUCATION: CPT

## 2021-06-07 NOTE — PROGRESS NOTES
Daily Note     Today's date: 2021  Patient name: Raine Donovan  : 1990  MRN: [id number]  Referring provider: Karan Valdez CNM  Dx:   Encounter Diagnosis     ICD-10-CM    1  Pelvic pain  R10 2    2  Acute midline low back pain without sciatica  M54 5        Start Time: 1200  Stop Time: 1300  Total time in clinic (min): 60 minutes    Subjective: Patient noted that she does not have pelvic pain today, however over the last 48 hours she noticed L rib pain under the breast        Objective: See treatment diary below  PERFECT Score: 4-/3/5 5/8 (relaxation phase took 2-3 seconds)  Sensation and reflexes:intact  TTP: 1st and second layer 10 o'clock and 2 o'clock, third layer R side  DA present: yes, 1 finger above umbilicus, 2 fingers at umbilicus, WNL below umbilicus  Ballooning with upper abdominal crunch  Diaphragm position: > 90 degrees at rest and increased with upper abdominal crunch        Assessment: PT performed internal and external exam of the pelvic floor as well as an abdominal assessment to assess diastasis recti  Patient provided verbal consent for internal and external exam of the pelvic floor  PT noted during the exam the patient was able to perform a kegel for a short period of time, however bearing down or the relaxation phase of the kegel was limited  PT noted  Increased diastasis recti during abdominal assessment  PT introduced upper abdominal crunch and diaphragmatic breathing to assist c diastasis  Patient required min VCs for form  PT educated the patient on the pelvic floor using the model  Patient would benefit from continued PT to allow the patient to return to her PLOF  Plan: Continue per plan of care  Precautions: hx of post partum depression         Manuals            Internal exam nv MW           External exam nv MW           Abdominal assessment nv MW           Iliopsoas release  MW  R           Neuro Re-Ed             Pelvic floor anatomy and function, bladder logs and bathroom mechanics edu MW MW  anatomy edu           TA nv            kegel nv            Diaphragmatic breathing nv 2x10           DB + upper abdominal crunch  Visual cues  2x10                                     Ther Ex             Open books nv            Butterfly stretch nv            Piriformis stetch nv            Hip add nv            Hip abd nv                                                   Ther Activity                                       Gait Training                                       Modalities

## 2021-06-14 ENCOUNTER — OFFICE VISIT (OUTPATIENT)
Dept: PHYSICAL THERAPY | Facility: CLINIC | Age: 31
End: 2021-06-14
Payer: COMMERCIAL

## 2021-06-14 DIAGNOSIS — M54.50 ACUTE MIDLINE LOW BACK PAIN WITHOUT SCIATICA: ICD-10-CM

## 2021-06-14 DIAGNOSIS — R10.2 PELVIC PAIN: Primary | ICD-10-CM

## 2021-06-14 PROCEDURE — 97112 NEUROMUSCULAR REEDUCATION: CPT

## 2021-06-14 PROCEDURE — 97110 THERAPEUTIC EXERCISES: CPT

## 2021-06-14 NOTE — PROGRESS NOTES
Daily Note     Today's date: 2021  Patient name: Eleazar Elizondo  : 1990  MRN: [id number]  Referring provider: Ryan Becerra CNM  Dx:   Encounter Diagnosis     ICD-10-CM    1  Pelvic pain  R10 2    2  Acute midline low back pain without sciatica  M54 5        Start Time: 1200  Stop Time: 1300  Total time in clinic (min): 60 minutes    Subjective: Patient noted no pelvic pain at the start of the session  Patient noted that she is trying to avoid the just in case pee  Patient noted she was able to hold her urine for 4 hours without incontinence  Patient noted she did have one episode of fecal incontinence this past week due to her breast feeding and not getting to the toilet on time  Objective: See treatment diary below      Assessment: PT introduced various hip strengthening and stretching exercises to assist c pain and incontinence  Patient required min VCs for form  Patient noted some discomfort in the hip adductors during butterfly stretch, however after happy baby pose the butterfly stretch improved  Patient made great progress with diaphragmatic breathing and upper abdominal crunches 2* less cues  Patient would benefit from continued PT to allow the patient to return to her PLOF  Plan: Continue per plan of care  Precautions: hx of post partum depression         Manuals           Internal exam nv MW           External exam nv MW           Abdominal assessment nv MW           Iliopsoas release  MW  R           Neuro Re-Ed             Pelvic floor anatomy and function, bladder logs and bathroom mechanics edu MW MW  anatomy edu MW voiding edu          TA nv  x15  3" hold          kegel nv  nv          Diaphragmatic breathing nv 2x10 3x10          DB + upper abdominal crunch  Visual cues  2x10 2x10                                    Ther Ex             Open books nv  5x15" ea          Butterfly stretch nv  Tried, pain, post happy baby improved          Piriformis stetch nv  3x30" ea          Hip add nv  2x10  5" hold          Hip abd nv  PTB  2x10  5" hold          Lower trunk rotations   5x15" ea          Happy baby pose   5x10"                       Ther Activity                                       Gait Training                                       Modalities

## 2021-06-21 ENCOUNTER — APPOINTMENT (OUTPATIENT)
Dept: PHYSICAL THERAPY | Facility: CLINIC | Age: 31
End: 2021-06-21
Payer: COMMERCIAL

## 2021-06-24 ENCOUNTER — OFFICE VISIT (OUTPATIENT)
Dept: PHYSICAL THERAPY | Facility: CLINIC | Age: 31
End: 2021-06-24
Payer: COMMERCIAL

## 2021-06-24 DIAGNOSIS — M54.50 ACUTE MIDLINE LOW BACK PAIN WITHOUT SCIATICA: ICD-10-CM

## 2021-06-24 DIAGNOSIS — R10.2 PELVIC PAIN: Primary | ICD-10-CM

## 2021-06-24 PROCEDURE — 97110 THERAPEUTIC EXERCISES: CPT

## 2021-06-24 PROCEDURE — 97112 NEUROMUSCULAR REEDUCATION: CPT

## 2021-06-24 NOTE — PROGRESS NOTES
Daily Note     Today's date: 2021  Patient name: Germain Valdovinos  : 1990  MRN: [id number]  Referring provider: Inez Cavazos CNM  Dx:   Encounter Diagnosis     ICD-10-CM    1  Pelvic pain  R10 2    2  Acute midline low back pain without sciatica  M54 5        Start Time: 1100  Stop Time: 1155  Total time in clinic (min): 55 minutes    Subjective: Patient noted that she feels stronger and has no pain in the back or pelvis at the start of the session  Patient noted that she has been walking on the treadmill everyday for 30 mins  Objective: See treatment diary below      Assessment: PT introduced clams, sidelying hip abd and kegels to assist c stabilization during functional activities  Patient required moderate cues for form and the patient noted fatigue  Overall, the patient continues to improve with hip strength and flexibility 2* less pain and increased activity at home  PT added to her HEP  Patient would benefit from continued PT to allow the patient to return to her PLOF  Plan: Continue per plan of care  Precautions: hx of post partum depression         Manuals          Internal exam nv MW           External exam nv MW           Abdominal assessment nv MW           Iliopsoas release  MW  R           Neuro Re-Ed             Pelvic floor anatomy and function, bladder logs and bathroom mechanics edu MW MW  anatomy edu MW voiding edu          TA + DB nv  x15  3" hold 2x10  3" hold         kegel + DB nv  nv x15  3" hold         Diaphragmatic breathing nv 2x10 3x10 3x10 supine         DB + upper abdominal crunch  Visual cues  2x10 2x10          clams    2x10 ea         sidelying hip abd    x15 ea         Ther Ex             Open books nv  5x15" ea 5x15" ea         Butterfly stretch nv  Tried, pain, post happy baby improved hold         Piriformis stetch nv  3x30" ea 3x30" ea         Hip add nv  2x10  5" hold x25  5" hold         Hip abd nv  PTB  2x10  5" hold BTB  2x10  5" hold         Lower trunk rotations   5x15" ea 5x15" ea         Happy baby pose   5x10" 5x10"                      Ther Activity                                       Gait Training                                       Modalities

## 2021-06-28 ENCOUNTER — OFFICE VISIT (OUTPATIENT)
Dept: PHYSICAL THERAPY | Facility: CLINIC | Age: 31
End: 2021-06-28
Payer: COMMERCIAL

## 2021-06-28 DIAGNOSIS — R10.2 PELVIC PAIN: Primary | ICD-10-CM

## 2021-06-28 DIAGNOSIS — M54.50 ACUTE MIDLINE LOW BACK PAIN WITHOUT SCIATICA: ICD-10-CM

## 2021-06-28 PROCEDURE — 97110 THERAPEUTIC EXERCISES: CPT

## 2021-06-28 PROCEDURE — 97112 NEUROMUSCULAR REEDUCATION: CPT

## 2021-06-28 NOTE — PROGRESS NOTES
Daily Note     Today's date: 2021  Patient name: Grupo Bethea  : 1990  MRN: [id number]  Referring provider: Blas Hurd CNM  Dx:   Encounter Diagnosis     ICD-10-CM    1  Pelvic pain  R10 2    2  Acute midline low back pain without sciatica  M54 5        Start Time: 1200  Stop Time: 1300  Total time in clinic (min): 60 minutes    Subjective: Patient noted she took a bath over the weekend with epsom salt and noted cramping and some pain afterwards on the R side of her pelvic floor  Patient noted it went away the next day  Patient noted she went on a 40 min hike yesterday and noted some soreness, however after the car ride it went away  Objective: See treatment diary below      Assessment: PT introduced TA contractions with UE and LE movement to assist c functional activities at home  Patient required moderate cues for form, however she performed the exercises with proper mechanics post cues  Patient continues to note increased muscle soreness after clams and sidelying hip abd, however her form improved from last session  Patient noted no pain post session  PT added to her HEP  Patient would benefit from continued PT to allow the patient to return to her PLOF  Plan: Continue per plan of care  Precautions: hx of post partum depression         Manuals         Internal exam nv MW           External exam nv MW           Abdominal assessment nv MW           Iliopsoas release  MW  R           Neuro Re-Ed             Pelvic floor anatomy and function, bladder logs and bathroom mechanics edu MW MW  anatomy edu MW voiding edu          TA + DB nv  x15  3" hold 2x10  3" hold x10  3" hold        DB + TA + fallouts     x10 ea        DB + TA+ marching     x10 ea        DB + TA + overhead lift     x10        kegel + DB nv  nv x15  3" hold x20  3" hold  supine        Diaphragmatic breathing nv 2x10 3x10 3x10 supine 4x10  supine        DB + upper abdominal crunch Visual cues  2x10 2x10          clams    2x10 ea 2x10 ea        sidelying hip abd    x15 ea x15 ea        Ther Ex             Open books nv  5x15" ea 5x15" ea 5x15" ea        Butterfly stretch nv  Tried, pain, post happy baby improved hold         Piriformis stetch nv  3x30" ea 3x30" ea 3x30" ea        Hip add nv  2x10  5" hold x25  5" hold         Hip abd nv  PTB  2x10  5" hold BTB  2x10  5" hold         Lower trunk rotations   5x15" ea 5x15" ea         Happy baby pose   5x10" 5x10" 5x15"                      Ther Activity                                       Gait Training                                       Modalities

## 2021-07-07 ENCOUNTER — APPOINTMENT (OUTPATIENT)
Dept: PHYSICAL THERAPY | Facility: CLINIC | Age: 31
End: 2021-07-07
Payer: COMMERCIAL

## 2021-07-12 ENCOUNTER — OFFICE VISIT (OUTPATIENT)
Dept: PHYSICAL THERAPY | Facility: CLINIC | Age: 31
End: 2021-07-12
Payer: COMMERCIAL

## 2021-07-12 DIAGNOSIS — M54.50 ACUTE MIDLINE LOW BACK PAIN WITHOUT SCIATICA: ICD-10-CM

## 2021-07-12 DIAGNOSIS — R10.2 PELVIC PAIN: Primary | ICD-10-CM

## 2021-07-12 PROCEDURE — 97112 NEUROMUSCULAR REEDUCATION: CPT

## 2021-07-12 PROCEDURE — 97110 THERAPEUTIC EXERCISES: CPT

## 2021-07-12 PROCEDURE — 97140 MANUAL THERAPY 1/> REGIONS: CPT

## 2021-07-12 NOTE — PROGRESS NOTES
Daily Note     Today's date: 2021  Patient name: Danyell Yan  : 1990  MRN: [id number]  Referring provider: Elisha Russell CNM  Dx:   Encounter Diagnosis     ICD-10-CM    1  Pelvic pain  R10 2    2  Acute midline low back pain without sciatica  M54 5        Start Time: 1200  Stop Time: 1300  Total time in clinic (min): 60 minutes    Subjective: Patient noted after last session she was sore in the pelvic floor on the R side  Patient noted the soreness lasted for about 48-72 hours      Objective: See treatment diary below      Assessment: PT introduced 3 way hip and DL squats to assist c return to strength training at home  Patient required min VCs for form  Patient noted some soreness post session, however not high levels of pain  PT educated and performed an external release of the pelvic floor to assist c R sided pelvic soreness  Patient provided verbal consent for external release of the pelvic floor  Patient noted improvements post session  Patient would benefit from continued PT to allow the patient to return to her PLOF  Plan: Continue per plan of care  Precautions: hx of post partum depression         Manuals        Internal exam nv MW           External exam nv MW    external massage  MW       Abdominal assessment nv MW           Iliopsoas release  MW  R           Neuro Re-Ed             Pelvic floor anatomy and function, bladder logs and bathroom mechanics edu MW MW  anatomy edu MW voiding edu   MW  external peritneal massage       TA + DB nv  x15  3" hold 2x10  3" hold x10  3" hold x10  3" hold       DB + TA + fallouts     x10 ea x10 ea       DB + TA+ marching     x10 ea x10 ea       DB + TA + overhead lift     x10 x10       kegel + DB nv  nv x15  3" hold x20  3" hold  supine x10  3" hold       Diaphragmatic breathing nv 2x10 3x10 3x10 supine 4x10  supine 4x10 supine       DB + upper abdominal crunch  Visual cues  2x10 2x10          clams 2x10 ea 2x10 ea        sidelying hip abd    x15 ea x15 ea        DL squat + TA      x10       Ther Ex             Open books nv  5x15" ea 5x15" ea 5x15" ea        Butterfly stretch nv  Tried, pain, post happy baby improved hold  3x15" ea improved       Piriformis stetch nv  3x30" ea 3x30" ea 3x30" ea 3x30" ea       Hip add nv  2x10  5" hold x25  5" hold         Hip abd nv  PTB  2x10  5" hold BTB  2x10  5" hold         Lower trunk rotations   5x15" ea 5x15" ea         Happy baby pose   5x10" 5x10" 5x15"         3 way hip      x10 ea (B)       Ther Activity                                       Gait Training                                       Modalities

## 2021-07-19 ENCOUNTER — OFFICE VISIT (OUTPATIENT)
Dept: PHYSICAL THERAPY | Facility: CLINIC | Age: 31
End: 2021-07-19
Payer: COMMERCIAL

## 2021-07-19 DIAGNOSIS — M54.50 ACUTE MIDLINE LOW BACK PAIN WITHOUT SCIATICA: ICD-10-CM

## 2021-07-19 DIAGNOSIS — R10.2 PELVIC PAIN: Primary | ICD-10-CM

## 2021-07-19 PROCEDURE — 97140 MANUAL THERAPY 1/> REGIONS: CPT

## 2021-07-19 PROCEDURE — 97112 NEUROMUSCULAR REEDUCATION: CPT

## 2021-07-19 PROCEDURE — 97110 THERAPEUTIC EXERCISES: CPT

## 2021-07-19 NOTE — PROGRESS NOTES
Daily Note     Today's date: 2021  Patient name: Latasha Brand  : 1990  MRN: [id number]  Referring provider: Francesco Cheney MD  Dx:   Encounter Diagnosis     ICD-10-CM    1  Pelvic pain  R10 2    2  Acute midline low back pain without sciatica  M54 5        Start Time: 1200  Stop Time: 1300  Total time in clinic (min): 60 minutes    Subjective: Patient noted since last session she has had less pelvic pain and no incontinence  Patient noted she has been doing yoga once a day and walking two miles a day  Patient noted her pelvic pain occurs more at night and early in the morning and with more activity  Objective: See treatment diary below      Assessment: PT performed pelvic rocking as well as lumbar mobilizations to assist c stiffness and pain  PT also performed STM to the piriformis and hip flexor musculature  Patient noted improvements in pain levels post manuals  PT introduced a downward dog to a tall pigeon stretch to assist c pelvic and posterior capsule of the hip flexibility  PT educated the patient on her HEP  Patient would benefit from continued PT to allow the patient to return to her PLOF  Plan: Continue per plan of care  Precautions: hx of post partum depression         Manuals       Internal exam nv MW           External exam nv MW    external massage  MW       Abdominal assessment nv MW           Iliopsoas release  MW  R     MW R/L  + piriformis      Lumbar PA mobs and pelvic rocking       MW  Grade  III      Neuro Re-Ed             Pelvic floor anatomy and function, bladder logs and bathroom mechanics edu MW MW  anatomy edu MW voiding edu   MW  external peritneal massage MW tennis ball for STM of glutes      TA + DB nv  x15  3" hold 2x10  3" hold x10  3" hold x10  3" hold       DB + TA + fallouts     x10 ea x10 ea       DB + TA+ marching     x10 ea x10 ea       DB + TA + overhead lift     x10 x10       kegel + DB nv  nv x15  3" hold x20  3" hold  supine x10  3" hold       Diaphragmatic breathing nv 2x10 3x10 3x10 supine 4x10  supine 4x10 supine       DB + upper abdominal crunch  Visual cues  2x10 2x10          clams    2x10 ea 2x10 ea        sidelying hip abd    x15 ea x15 ea        DL squat + TA      x10       Ther Ex             Open books nv  5x15" ea 5x15" ea 5x15" ea        Butterfly stretch nv  Tried, pain, post happy baby improved hold  3x15" ea improved       Piriformis stetch nv  3x30" ea 3x30" ea 3x30" ea 3x30" ea 3x30" ea      Down dog to tall pigeon        x4 ea      Hip add nv  2x10  5" hold x25  5" hold         Hip abd nv  PTB  2x10  5" hold BTB  2x10  5" hold         Lower trunk rotations   5x15" ea 5x15" ea         Happy baby pose   5x10" 5x10" 5x15"         3 way hip      x10 ea (B)       Ther Activity                                       Gait Training                                       Modalities

## 2021-07-26 ENCOUNTER — OFFICE VISIT (OUTPATIENT)
Dept: PHYSICAL THERAPY | Facility: CLINIC | Age: 31
End: 2021-07-26
Payer: COMMERCIAL

## 2021-07-26 DIAGNOSIS — R10.2 PELVIC PAIN: Primary | ICD-10-CM

## 2021-07-26 DIAGNOSIS — M54.50 ACUTE MIDLINE LOW BACK PAIN WITHOUT SCIATICA: ICD-10-CM

## 2021-07-26 PROCEDURE — 97112 NEUROMUSCULAR REEDUCATION: CPT

## 2021-07-26 PROCEDURE — 97110 THERAPEUTIC EXERCISES: CPT

## 2021-07-26 PROCEDURE — 97140 MANUAL THERAPY 1/> REGIONS: CPT

## 2021-07-26 NOTE — PROGRESS NOTES
Daily Note     Today's date: 2021  Patient name: Neetu Dempsey  : 1990  MRN: [id number]  Referring provider: Karen Tellez CNM  Dx:   Encounter Diagnosis     ICD-10-CM    1  Pelvic pain  R10 2    2  Acute midline low back pain without sciatica  M54 5        Start Time: 1200  Stop Time: 1300  Total time in clinic (min): 60 minutes    Subjective: Patient noted she feels stronger in her core, however her pelvic pain continues with a well women exam  Patient also noted increased LBP with core exercises at times  Patient asked about training for a 5K  Objective: See treatment diary below      Assessment: PT performed internal release to the R 1st and 2nd layer to assist c pelvic pain  Patient provided verbal consent for internal release  She noted improvements in pain levels post release  PT also performed pelvic and lumbar mobilizations to assist c pain  Patient performed thread the needle and cat/camel exercise to assist c mobility in the lumbar and sacral area  Patient noted improvements in mobility post session  PT educated the patient on walking for 3 miles 3x/week prior to training for 5K  Patient would benefit from continued PT to allow the patient to return to her PLOF  Plan: Continue per plan of care  Precautions: hx of post partum depression         Manuals      Internal exam nv MW      MW     External exam nv MW    external massage  MW       Abdominal assessment nv MW           Iliopsoas release  MW  R     MW R/L  + piriformis      Lumbar PA mobs and pelvic rocking       MW  Grade  III MW  Grade  III     Neuro Re-Ed             Pelvic floor anatomy and function, bladder logs and bathroom mechanics edu MW MW  anatomy edu MW voiding edu   MW  external peritneal massage MW tennis ball for STM of glutes MW walking edu, HEP     TA + DB nv  x15  3" hold 2x10  3" hold x10  3" hold x10  3" hold       DB + TA + fallouts     x10 ea x10 ea DB + TA+ marching     x10 ea x10 ea       DB + TA + overhead lift     x10 x10       kegel + DB nv  nv x15  3" hold x20  3" hold  supine x10  3" hold       Diaphragmatic breathing nv 2x10 3x10 3x10 supine 4x10  supine 4x10 supine       DB + upper abdominal crunch  Visual cues  2x10 2x10          clams    2x10 ea 2x10 ea        sidelying hip abd    x15 ea x15 ea        DL squat + TA      x10       Ther Ex             Open books nv  5x15" ea 5x15" ea 5x15" ea        Butterfly stretch nv  Tried, pain, post happy baby improved hold  3x15" ea improved       Piriformis stetch nv  3x30" ea 3x30" ea 3x30" ea 3x30" ea 3x30" ea      Down dog to tall pigeon        x4 ea      Hip add nv  2x10  5" hold x25  5" hold         Hip abd nv  PTB  2x10  5" hold BTB  2x10  5" hold         Lower trunk rotations   5x15" ea 5x15" ea         Happy baby pose   5x10" 5x10" 5x15"         3 way hip      x10 ea (B)       Cat/camel        x10 ea     Thread the needle        x10 ea     Ther Activity                                       Gait Training                                       Modalities

## 2021-08-04 ENCOUNTER — OFFICE VISIT (OUTPATIENT)
Dept: PHYSICAL THERAPY | Facility: CLINIC | Age: 31
End: 2021-08-04
Payer: COMMERCIAL

## 2021-08-04 DIAGNOSIS — M54.50 ACUTE MIDLINE LOW BACK PAIN WITHOUT SCIATICA: ICD-10-CM

## 2021-08-04 DIAGNOSIS — R10.2 PELVIC PAIN: Primary | ICD-10-CM

## 2021-08-04 PROCEDURE — 97112 NEUROMUSCULAR REEDUCATION: CPT

## 2021-08-04 PROCEDURE — 97140 MANUAL THERAPY 1/> REGIONS: CPT

## 2021-08-04 NOTE — PROGRESS NOTES
Daily Note     Today's date: 2021  Patient name: Cleopatra Hess  : 1990  MRN: [id number]  Referring provider: Nila Laura CNM  Dx:   Encounter Diagnosis     ICD-10-CM    1  Pelvic pain  R10 2    2  Acute midline low back pain without sciatica  M54 5        Start Time: 1200  Stop Time: 1300  Total time in clinic (min): 60 minutes    Subjective: Patient noted that she was able to walk 4 times this past week at 3 miles each time without pain  Patient noted a little discomfort in the back after the 4th day  Patient noted she has her period now for the first time since being pregnant and is unable to insert a tampon  Objective: See treatment diary below      Assessment: PT noted improvements with lumbar and pelvic mobility post manuals  Patient performed kegels in various positions with min VCs for form  PT educated the patient on starting her return to running program and to avoid tampons for the time being due to increased pain  Patient would benefit from continued PT to allow the patient to return to her PLOF  Plan: Continue per plan of care  Precautions: hx of post partum depression         Manuals     Internal exam nv MW      MW nv    External exam nv MW    external massage  MW       Abdominal assessment nv MW           Iliopsoas release  MW  R     MW R/L  + piriformis      Lumbar PA mobs and pelvic rocking       MW  Grade  III MW  Grade  III     Neuro Re-Ed             Pelvic floor anatomy and function, bladder logs and bathroom mechanics edu MW MW  anatomy edu MW voiding edu   MW  external peritneal massage MW tennis ball for STM of glutes MW walking edu, HEP MW return to run program, HEP    TA + DB nv  x15  3" hold 2x10  3" hold x10  3" hold x10  3" hold       DB + TA + fallouts     x10 ea x10 ea       DB + TA+ marching     x10 ea x10 ea       DB + TA + overhead lift     x10 x10       kegel + DB nv  nv x15  3" hold x20  3" hold  supine x10  3" hold   supine, quad, seated  2x10  3" hold ea    Diaphragmatic breathing nv 2x10 3x10 3x10 supine 4x10  supine 4x10 supine       DB + upper abdominal crunch  Visual cues  2x10 2x10          clams    2x10 ea 2x10 ea        sidelying hip abd    x15 ea x15 ea        DL squat + TA      x10       Ther Ex             Open books nv  5x15" ea 5x15" ea 5x15" ea        Butterfly stretch nv  Tried, pain, post happy baby improved hold  3x15" ea improved       Piriformis stetch nv  3x30" ea 3x30" ea 3x30" ea 3x30" ea 3x30" ea      Down dog to tall pigeon        x4 ea  3x30" ea    Hip add nv  2x10  5" hold x25  5" hold         Hip abd nv  PTB  2x10  5" hold BTB  2x10  5" hold         Lower trunk rotations   5x15" ea 5x15" ea         Happy baby pose   5x10" 5x10" 5x15"         3 way hip      x10 ea (B)       Cat/camel        x10 ea     Thread the needle        x10 ea     Ther Activity                                       Gait Training                                       Modalities

## 2021-08-06 ENCOUNTER — OFFICE VISIT (OUTPATIENT)
Dept: PHYSICAL THERAPY | Facility: CLINIC | Age: 31
End: 2021-08-06
Payer: COMMERCIAL

## 2021-08-06 DIAGNOSIS — M54.50 ACUTE MIDLINE LOW BACK PAIN WITHOUT SCIATICA: ICD-10-CM

## 2021-08-06 DIAGNOSIS — R10.2 PELVIC PAIN: Primary | ICD-10-CM

## 2021-08-06 PROCEDURE — 97112 NEUROMUSCULAR REEDUCATION: CPT

## 2021-08-06 PROCEDURE — 97164 PT RE-EVAL EST PLAN CARE: CPT

## 2021-08-06 PROCEDURE — 97140 MANUAL THERAPY 1/> REGIONS: CPT

## 2021-08-06 NOTE — PROGRESS NOTES
PT Re-Evaluation  and PT Discharge    Today's date: 2021  Patient name: Rochelle Leach  : 1990  MRN: [id number]  Referring provider: Lacie Forbes CNM  Dx:   Encounter Diagnosis     ICD-10-CM    1  Pelvic pain  R10 2    2  Acute midline low back pain without sciatica  M54 5        Start Time: 0900  Stop Time: 0945  Total time in clinic (min): 45 minutes    Assessment  Assessment details: Patient is a 26 yo female presenting to pelvic floor physical therapy with symptoms consistent pelvic pain, gas incontinence and prolapse  Since starting physical therapy the patient has improved with core, hip and pelvic floor strength, endurance, activity tolerance and fecal incontinence  Patient continues to have pelvic pain, however her pain has decreased since IE  Patient is able to walk and perform sit to stands without pain which is an improvement  Primary PT is going on maternity leave and the patient will be D/C, however she will continue with another primary PT while she is on leave  PT educated the patient on her HEP and the patient feels (I) with them  PT will continue to address the noted impairments by performing hip, core and pelvic floor strengthening, stretching, biofeedback, functional activities and manual techniques to allow the patient to return to her PLOF  PT recommended 1x/week for 6-8 weeks c a good prognosis 2* noted improvements         Impairments: activity intolerance, impaired physical strength, lacks appropriate home exercise program, pain with function, poor posture  and poor body mechanics    Symptom irritability: moderateUnderstanding of Dx/Px/POC: good   Prognosis: good    Goals  STG: In four weeks the patient will:    1  Be (I) with her HEP  (MET)  2  Increase hip and core strength to 4+/5 MMT score to assist c functional activities  (90% MET)  3  Complete daily voiding and bowel log   (MET)  4  Perform x10 diaphragmatic breathes without cues  (MET)  5   Perform x 10 Burt Berg with a 4-5 second hold with proper breathing and relaxation of the pelvic floor muscles   (MET)      LTG: In eight weeks, the patient will:    1  Perform x10 TA contractions with proper activation and no cues provided by PT  (MET)  2  Void without straining in the proper body mechanics  (MET)  3  tolerating speculum for well woman exam with minimal to no pain post  (in progress)  4  Increase hip and core strength to 5/5 MMT score to assist c prolonged activities  (in progress)      Plan  Patient would benefit from: skilled physical therapy and women's health eval  Planned modality interventions: biofeedback, cryotherapy and thermotherapy: hydrocollator packs  Planned therapy interventions: abdominal trunk stabilization, joint mobilization, manual therapy, massage, Romero taping, neuromuscular re-education, patient education, postural training, strengthening, stretching, therapeutic activities, therapeutic exercise, transfer training, home exercise program, functional ROM exercises, flexibility, breathing training, body mechanics training and balance  Frequency: 1x week  Duration in visits: 8  Duration in weeks: 8  Plan of Care beginning date: 7/28/2021  Plan of Care expiration date: 8/6/2021  Treatment plan discussed with: patient        PT Pelvic Floor Subjective:   History of Present Illness:       Delivery Type comment:  Occiput posteriorPatient noted since starting physical therapy she has had an 50% improvement with her core strength, 20% improvement in pelvic/vaginal pain, 50% improvement with LBP and 70% improvement with fecal incontinence  Patient notes no vaginal pain with sit to stands which is an improvement  Patient is able to complete ADLs with minimal to no pain, however a well women exam continues to be painful  Patient notes that internal releases of the pelvic floor has decreased in pain from a 8/10 to a 5/10 since starting physical therapy   Patient has progressed with recreational activities 2* walking 3 miles at least 3-4 times a week without pelvic pain  Patient would like to start a couch to 5K program  Mechanism of injury: childbirth          Recurrent probem    Quality of life: fair    Social Support:     Lives in:  Multiple-level home (No pain with stairs)    Lives with:  Spouse and young children (11 week old boy)    Relationship status: /committed    Employment status: Not going back to work  Life stress level: 8    History of Depression: yes    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 -insomnia worst symptom  Hand dominance:  Right  Diet and Exercise:      Exercise type: walking    3 days a week, 3 miles without pain    Exercise frequency: 3-4 times per week  OB/ gyn History    Gestational History:     Prior Pregnancy: Yes      Number of prior pregnancies: 1    Number of term pregnancies: 1    Delivery Type: vaginal delivery      Number of vaginal deliveries: 1    Delivery Complications:  2nd degree tear (R) and episiotomy  Menstrual History:    Date of last menstrual period: 8/5/2021    Menstrual irregularities regular menses    Painful periods:  Difficulty managing menstrual pain (Symptoms have increased during period)    Tolerates tampons: no (Painful after birth)  Bladder Function:     Voiding Difficulties negative for: urgency, frequent urination, straining and incomplete emptying      Voiding Difficulties comments:     Voiding frequency: every 1-2 hours and every 3-4 hours    Urinary leakage: no urine leakage    Nocturia (episodes per night): 1    Painful urination: No      Fluid Intake Type: Water and soda (Oat milk)    Intake (ounces): Water intake (oz): 3 L a day  Soda intake (oz): 1 can of soda a day     Incontinence Management:     Pads/Diaper Use:  None    Patient has attempted at least 4 weeks of independent pelvic floor strengthening exercises without a resolution of symptoms  Bowel Function:     Voiding DIfficulties: urgency and unfinished feeling after defecating      Voiding DIfficulties comment:  Has improved with unfished feeling    Bowel frequency: daily    [place] Stool Scale: type 4    Stool softener use: no stool softeners    Enema use: no enema    Uses "squatty potty": no Squatty Potty  Sexual Function:     Sexually Active:  Not sexually active, sexually active and single partner    pain causes abstinence    Patient wishes to return to having intercourse: currently unable to have intercourse but wants to  Pain:     Current pain ratin    At best pain ratin    At worst pain ratin (vaginal pain)    Location:  R side internally in the pelvis where the tear is    Onset:  1-3 months ago    Quality:  Dull ache (bulging sensation)    Aggravating factors:  [place], exercise and menses (tampon insertion)    Duration of symptoms:  Less than 1 hour    Relieving factors:  Rest    Progression:  Improved  Patient Goals:     Patient goals for therapy:  Improved pain management, improved quality of life, relaxation, return to sport/leisure activities, improved comfort, improved bladder or bowel function, decreased pain and decreased interpersonal problems    Other patient goals:  "to tolerate a well women exam without pain " (in progress) "to get stronger for return to exercise " (in progress)      Objective     Strength/Myotome Testing     Left Hip   Planes of Motion   Flexion: 4+  Extension: 4  Abduction: 4+  Adduction: 4+    Right Hip   Planes of Motion   Flexion: 4+  Extension: 4  Abduction: 4+  Adduction: 4+  Pelvic Floor Exam   Abdominal assessment: < 90 degrees rib angle    Diastatis   Diastasis recti present: yes  3" above umbilicus (# fingers): 1  Umbilicus (# fingers): 2  3" below umbilicus (# fingers): 0    General Perineum Exam:   perineum intact       Visual Inspection of Perineum:   Excursion of perineal body in cephalad direction with contraction of pelvic floor muscles (PFM): fair   Excursion of perineal body in caudal direction with relaxation of pelvic floor muscles (PFM): fair   Sphincter Tone Resting: normal  Sensation: intact    Pelvic Floor Muscle Exam     Muscle Contraction: well isolated  Breathing pattern with contraction: diaphragmatic  Pelvic floor muscle relaxation is delayed  2 seconds required for complete relaxation  PERFECT Score   Power right: 4/5  Power left: 4/5  Endurance (seconds to max): 10  Repetitions (before fatigue): 1 5  Fast flicks (in 10 seconds): 8               Precautions: hx of post partum depression           Manuals 6/2 6/7 6/14 6/24 6/28 7/12 7/19 7/26 8/4 8/6   Internal exam nv MW      MW nv MW   External exam nv MW    external massage  MW       Abdominal assessment nv MW           Iliopsoas release  MW  R     MW R/L  + piriformis      Lumbar PA mobs and pelvic rocking       MW  Grade  III MW  Grade  III     Re-eval          MW   Neuro Re-Ed             Pelvic floor anatomy and function, bladder logs and bathroom mechanics edu MW MW  anatomy edu MW voiding edu   MW  external peritneal massage MW tennis ball for STM of glutes MW walking edu, HEP MW return to run program, HEP HEP edu    MW   TA + DB nv  x15  3" hold 2x10  3" hold x10  3" hold x10  3" hold       DB + TA + fallouts     x10 ea x10 ea       DB + TA+ marching     x10 ea x10 ea       DB + TA + overhead lift     x10 x10       kegel + DB nv  nv x15  3" hold x20  3" hold  supine x10  3" hold   supine, quad, seated  2x10  3" hold ea    Diaphragmatic breathing nv 2x10 3x10 3x10 supine 4x10  supine 4x10 supine       DB + upper abdominal crunch  Visual cues  2x10 2x10          clams    2x10 ea 2x10 ea        sidelying hip abd    x15 ea x15 ea        DL squat + TA      x10       Ther Ex             Open books nv  5x15" ea 5x15" ea 5x15" ea        Butterfly stretch nv  Tried, pain, post happy baby improved hold  3x15" ea improved       Piriformis stetch nv  3x30" ea 3x30" ea 3x30" ea 3x30" ea 3x30" ea      Down dog to tall pigeon        x4 ea  3x30" ea Hip add nv  2x10  5" hold x25  5" hold         Hip abd nv  PTB  2x10  5" hold BTB  2x10  5" hold         Lower trunk rotations   5x15" ea 5x15" ea         Happy baby pose   5x10" 5x10" 5x15"         3 way hip      x10 ea (B)       Cat/camel        x10 ea     Thread the needle        x10 ea     Ther Activity                                       Gait Training                                       Modalities

## 2021-08-06 NOTE — LETTER
2021    Suze Vargas, [address] [address]    Patient: Abdifatah Cervantes   YOB: 1990   Date of Visit: 2021     Encounter Diagnosis     ICD-10-CM    1  Pelvic pain  R10 2    2  Acute midline low back pain without sciatica  M54 5        Dear Dr Genny Henleyail: Thank you for your recent referral of Abdifatah Cervantes  Please review the attached evaluation summary from Cherrie's recent visit  Please verify that you agree with the plan of care by signing the attached order  If you have any questions or concerns, please do not hesitate to call  I sincerely appreciate the opportunity to share in the care of one of your patients and hope to have another opportunity to work with you in the near future  Sincerely,    Evan Pack, PT      Referring Provider:      I certify that I have read the below Plan of Care and certify the need for these services furnished under this plan of treatment while under my care  [phone] [id number]  Via Fax: [phone]          PT Re-Evaluation  and PT Discharge    Today's date: 2021  Patient name: Abdifatah Cervantes  : 1990  MRN: [id number]  Referring provider: Alexandro Garcia CNM  Dx:   Encounter Diagnosis     ICD-10-CM    1  Pelvic pain  R10 2    2  Acute midline low back pain without sciatica  M54 5        Start Time: 0900  Stop Time: 0945  Total time in clinic (min): 45 minutes    Assessment  Assessment details: Patient is a 28 yo female presenting to pelvic floor physical therapy with symptoms consistent pelvic pain, gas incontinence and prolapse  Since starting physical therapy the patient has improved with core, hip and pelvic floor strength, endurance, activity tolerance and fecal incontinence  Patient continues to have pelvic pain, however her pain has decreased since IE   Patient is able to walk and perform sit to stands without pain which is an improvement  Primary PT is going on maternity leave and the patient will be D/C, however she will continue with another primary PT while she is on leave  PT educated the patient on her HEP and the patient feels (I) with them  PT will continue to address the noted impairments by performing hip, core and pelvic floor strengthening, stretching, biofeedback, functional activities and manual techniques to allow the patient to return to her PLOF  PT recommended 1x/week for 6-8 weeks c a good prognosis 2* noted improvements         Impairments: activity intolerance, impaired physical strength, lacks appropriate home exercise program, pain with function, poor posture  and poor body mechanics    Symptom irritability: moderateUnderstanding of Dx/Px/POC: good   Prognosis: good    Goals  STG: In four weeks the patient will:    1  Be (I) with her HEP  (MET)  2  Increase hip and core strength to 4+/5 MMT score to assist c functional activities  (90% MET)  3  Complete daily voiding and bowel log   (MET)  4  Perform x10 diaphragmatic breathes without cues  (MET)  5  Perform x 10 Kegels with a 4-5 second hold with proper breathing and relaxation of the pelvic floor muscles   (MET)      LTG: In eight weeks, the patient will:    1  Perform x10 TA contractions with proper activation and no cues provided by PT  (MET)  2  Void without straining in the proper body mechanics  (MET)  3  tolerating speculum for well woman exam with minimal to no pain post  (in progress)  4  Increase hip and core strength to 5/5 MMT score to assist c prolonged activities   (in progress)      Plan  Patient would benefit from: skilled physical therapy and women's health eval  Planned modality interventions: biofeedback, cryotherapy and thermotherapy: hydrocollator packs  Planned therapy interventions: abdominal trunk stabilization, joint mobilization, manual therapy, massage, Romero taping, neuromuscular re-education, patient education, postural training, strengthening, stretching, therapeutic activities, therapeutic exercise, transfer training, home exercise program, functional ROM exercises, flexibility, breathing training, body mechanics training and balance  Frequency: 1x week  Duration in visits: 8  Duration in weeks: 8  Plan of Care beginning date: 7/28/2021  Plan of Care expiration date: 8/6/2021  Treatment plan discussed with: patient        PT Pelvic Floor Subjective:   History of Present Illness:       Delivery Type comment:  Occiput posteriorPatient noted since starting physical therapy she has had an 50% improvement with her core strength, 20% improvement in pelvic/vaginal pain, 50% improvement with LBP and 70% improvement with fecal incontinence  Patient notes no vaginal pain with sit to stands which is an improvement  Patient is able to complete ADLs with minimal to no pain, however a well women exam continues to be painful  Patient notes that internal releases of the pelvic floor has decreased in pain from a 8/10 to a 5/10 since starting physical therapy  Patient has progressed with recreational activities 2* walking 3 miles at least 3-4 times a week without pelvic pain  Patient would like to start a couch to 5K program  Mechanism of injury: childbirth          Recurrent probem    Quality of life: fair    Social Support:     Lives in:  Multiple-level home (No pain with stairs)    Lives with:  Spouse and young children (11 week old boy)    Relationship status: /committed    Employment status: Not going back to work      Life stress level: 8    History of Depression: yes    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 -insomnia worst symptom  Hand dominance:  Right  Diet and Exercise:      Exercise type: walking    3 days a week, 3 miles without pain    Exercise frequency: 3-4 times per week  OB/ gyn History    Gestational History:     Prior Pregnancy: Yes      Number of prior pregnancies: 1    Number of term pregnancies: 1    Delivery Type: vaginal delivery      Number of vaginal deliveries: 1    Delivery Complications:  2nd degree tear (R) and episiotomy  Menstrual History:    Date of last menstrual period: 2021    Menstrual irregularities regular menses    Painful periods:  Difficulty managing menstrual pain (Symptoms have increased during period)    Tolerates tampons: no (Painful after birth)  Bladder Function:     Voiding Difficulties negative for: urgency, frequent urination, straining and incomplete emptying      Voiding Difficulties comments:     Voiding frequency: every 1-2 hours and every 3-4 hours    Urinary leakage: no urine leakage    Nocturia (episodes per night): 1    Painful urination: No      Fluid Intake Type: Water and soda (Oat milk)    Intake (ounces): Water intake (oz): 3 L a day  Soda intake (oz): 1 can of soda a day     Incontinence Management:     Pads/Diaper Use:  None    Patient has attempted at least 4 weeks of independent pelvic floor strengthening exercises without a resolution of symptoms  Bowel Function:     Voiding DIfficulties: urgency and unfinished feeling after defecating      Voiding DIfficulties comment:  Has improved with unfished feeling    Bowel frequency: daily    [place] Stool Scale: type 4    Stool softener use: no stool softeners    Enema use: no enema    Uses "squatty potty": no Squatty Potty  Sexual Function:     Sexually Active:  Not sexually active, sexually active and single partner    pain causes abstinence    Patient wishes to return to having intercourse: currently unable to have intercourse but wants to  Pain:     Current pain ratin    At best pain ratin    At worst pain ratin (vaginal pain)    Location:  R side internally in the pelvis where the tear is    Onset:  1-3 months ago    Quality:  Dull ache (bulging sensation)    Aggravating factors:  [place], exercise and menses (tampon insertion)    Duration of symptoms:  Less than 1 hour    Relieving factors:  Rest Progression:  Improved  Patient Goals:     Patient goals for therapy:  Improved pain management, improved quality of life, relaxation, return to sport/leisure activities, improved comfort, improved bladder or bowel function, decreased pain and decreased interpersonal problems    Other patient goals:  "to tolerate a well women exam without pain " (in progress) "to get stronger for return to exercise " (in progress)      Objective     Strength/Myotome Testing     Left Hip   Planes of Motion   Flexion: 4+  Extension: 4  Abduction: 4+  Adduction: 4+    Right Hip   Planes of Motion   Flexion: 4+  Extension: 4  Abduction: 4+  Adduction: 4+  Pelvic Floor Exam   Abdominal assessment: < 90 degrees rib angle    Diastatis   Diastasis recti present: yes  3" above umbilicus (# fingers): 1  Umbilicus (# fingers): 2  3" below umbilicus (# fingers): 0    General Perineum Exam:   perineum intact  Visual Inspection of Perineum:   Excursion of perineal body in cephalad direction with contraction of pelvic floor muscles (PFM): fair   Excursion of perineal body in caudal direction with relaxation of pelvic floor muscles (PFM): fair   Sphincter Tone Resting: normal  Sensation: intact    Pelvic Floor Muscle Exam     Muscle Contraction: well isolated  Breathing pattern with contraction: diaphragmatic  Pelvic floor muscle relaxation is delayed  2 seconds required for complete relaxation  PERFECT Score   Power right: 4/5  Power left: 4/5  Endurance (seconds to max): 10  Repetitions (before fatigue): 1 5  Fast flicks (in 10 seconds): 8               Precautions: hx of post partum depression           Manuals 6/2 6/7 6/14 6/24 6/28 7/12 7/19 7/26 8/4 8/6   Internal exam nv MW      MW nv MW   External exam nv MW    external massage  MW       Abdominal assessment nv MW           Iliopsoas release  MW  R     MW R/L  + piriformis      Lumbar PA mobs and pelvic rocking       MW  Grade  III MW  Grade  III     Re-eval          MW   Neuro Re-Ed             Pelvic floor anatomy and function, bladder logs and bathroom mechanics edu MW MW  anatomy edu MW voiding edu   MW  external peritneal massage MW tennis ball for STM of glutes MW walking edu, HEP MW return to run program, HEP HEP edu    MW   TA + DB nv  x15  3" hold 2x10  3" hold x10  3" hold x10  3" hold       DB + TA + fallouts     x10 ea x10 ea       DB + TA+ marching     x10 ea x10 ea       DB + TA + overhead lift     x10 x10       kegel + DB nv  nv x15  3" hold x20  3" hold  supine x10  3" hold   supine, quad, seated  2x10  3" hold ea    Diaphragmatic breathing nv 2x10 3x10 3x10 supine 4x10  supine 4x10 supine       DB + upper abdominal crunch  Visual cues  2x10 2x10          clams    2x10 ea 2x10 ea        sidelying hip abd    x15 ea x15 ea        DL squat + TA      x10       Ther Ex             Open books nv  5x15" ea 5x15" ea 5x15" ea        Butterfly stretch nv  Tried, pain, post happy baby improved hold  3x15" ea improved       Piriformis stetch nv  3x30" ea 3x30" ea 3x30" ea 3x30" ea 3x30" ea      Down dog to tall pigeon        x4 ea  3x30" ea    Hip add nv  2x10  5" hold x25  5" hold         Hip abd nv  PTB  2x10  5" hold BTB  2x10  5" hold         Lower trunk rotations   5x15" ea 5x15" ea         Happy baby pose   5x10" 5x10" 5x15"         3 way hip      x10 ea (B)       Cat/camel        x10 ea     Thread the needle        x10 ea     Ther Activity                                       Gait Training                                       Modalities

## 2021-08-11 ENCOUNTER — APPOINTMENT (OUTPATIENT)
Dept: PHYSICAL THERAPY | Facility: CLINIC | Age: 31
End: 2021-08-11
Payer: COMMERCIAL

## 2021-08-18 ENCOUNTER — APPOINTMENT (OUTPATIENT)
Dept: PHYSICAL THERAPY | Facility: CLINIC | Age: 31
End: 2021-08-18
Payer: COMMERCIAL

## 2021-08-25 ENCOUNTER — APPOINTMENT (OUTPATIENT)
Dept: PHYSICAL THERAPY | Facility: CLINIC | Age: 31
End: 2021-08-25
Payer: COMMERCIAL

## 2023-08-23 ENCOUNTER — TELEPHONE (OUTPATIENT)
Dept: PSYCHIATRY | Facility: CLINIC | Age: 33
End: 2023-08-23

## 2023-08-23 NOTE — TELEPHONE ENCOUNTER
Patient has been added to the [facility] wait list without a referral.    Insurance: [employer]  Insurance Type:    Commercial [x]   Medicaid []   [place] (if applicable)   Medicare []  Location Preference: [email or web address]  Provider Preference: no prov pref  Virtual: Yes [] No [x]

## 2024-01-08 NOTE — TELEPHONE ENCOUNTER
CD,  I spoke with the pt to reschedule 2/22 OV to later that day since it is not an available slot.    Pt is asking if she needs the appt with you at all since she is seeing Dr. Lyons who you referred her to.   
Manny, let know, NO, does not need f/u with me as seeing Dr. Lyons  
Noted. I spoke with the pt and cancelled.  
MVC

## 2024-05-01 ENCOUNTER — APPOINTMENT (OUTPATIENT)
Dept: LAB | Facility: CLINIC | Age: 34
End: 2024-05-01

## 2024-05-01 ENCOUNTER — APPOINTMENT (OUTPATIENT)
Dept: URGENT CARE | Facility: CLINIC | Age: 34
End: 2024-05-01

## 2024-05-01 DIAGNOSIS — Z02.1 PRE-EMPLOYMENT HEALTH SCREENING EXAMINATION: ICD-10-CM

## 2024-05-01 DIAGNOSIS — Z02.1 PRE-EMPLOYMENT HEALTH SCREENING EXAMINATION: Primary | ICD-10-CM

## 2024-05-01 LAB
MEV IGG SER QL IA: NORMAL
MUV IGG SER QL IA: NORMAL
RUBV IGG SERPL IA-ACNC: 51.6 IU/ML
VZV IGG SER QL IA: ABNORMAL

## 2024-05-01 PROCEDURE — 86735 MUMPS ANTIBODY: CPT

## 2024-05-01 PROCEDURE — 86765 RUBEOLA ANTIBODY: CPT

## 2024-05-01 PROCEDURE — 86787 VARICELLA-ZOSTER ANTIBODY: CPT

## 2024-05-01 PROCEDURE — 86480 TB TEST CELL IMMUN MEASURE: CPT

## 2024-05-01 PROCEDURE — 86762 RUBELLA ANTIBODY: CPT

## 2024-05-01 PROCEDURE — 36415 COLL VENOUS BLD VENIPUNCTURE: CPT

## 2024-05-02 LAB
GAMMA INTERFERON BACKGROUND BLD IA-ACNC: 0.01 IU/ML
M TB IFN-G BLD-IMP: NEGATIVE
M TB IFN-G CD4+ BCKGRND COR BLD-ACNC: 0.02 IU/ML
M TB IFN-G CD4+ BCKGRND COR BLD-ACNC: 0.04 IU/ML
MITOGEN IGNF BCKGRD COR BLD-ACNC: 9.99 IU/ML

## 2024-08-14 ENCOUNTER — TELEPHONE (OUTPATIENT)
Age: 34
End: 2024-08-14

## 2024-08-22 NOTE — TELEPHONE ENCOUNTER
Contacted patient off of [facility]  to verify needs of services in attempts to possibly offer patient an appointment at available locations. spoke with patient whom stated is no longer in need pf services. IC shared pt will be removed from wait list at this time.